# Patient Record
Sex: MALE | Race: ASIAN | Employment: STUDENT | URBAN - METROPOLITAN AREA
[De-identification: names, ages, dates, MRNs, and addresses within clinical notes are randomized per-mention and may not be internally consistent; named-entity substitution may affect disease eponyms.]

---

## 2019-11-12 ENCOUNTER — TRANSFERRED RECORDS (OUTPATIENT)
Dept: HEALTH INFORMATION MANAGEMENT | Facility: CLINIC | Age: 24
End: 2019-11-12

## 2020-01-13 ENCOUNTER — ANCILLARY PROCEDURE (OUTPATIENT)
Dept: ULTRASOUND IMAGING | Facility: CLINIC | Age: 25
End: 2020-01-13
Attending: FAMILY MEDICINE
Payer: COMMERCIAL

## 2020-01-13 DIAGNOSIS — N50.811 TESTICULAR PAIN, RIGHT: ICD-10-CM

## 2020-01-16 ENCOUNTER — MEDICAL CORRESPONDENCE (OUTPATIENT)
Dept: HEALTH INFORMATION MANAGEMENT | Facility: CLINIC | Age: 25
End: 2020-01-16

## 2020-03-24 ENCOUNTER — NURSE TRIAGE (OUTPATIENT)
Dept: NURSING | Facility: CLINIC | Age: 25
End: 2020-03-24

## 2020-03-24 ENCOUNTER — HOSPITAL ENCOUNTER (EMERGENCY)
Facility: CLINIC | Age: 25
Discharge: HOME | End: 2020-03-24
Attending: EMERGENCY MEDICINE | Admitting: EMERGENCY MEDICINE
Payer: COMMERCIAL

## 2020-03-24 VITALS
TEMPERATURE: 98 F | DIASTOLIC BLOOD PRESSURE: 52 MMHG | HEART RATE: 72 BPM | OXYGEN SATURATION: 100 % | WEIGHT: 160 LBS | SYSTOLIC BLOOD PRESSURE: 122 MMHG | HEIGHT: 73 IN | BODY MASS INDEX: 21.2 KG/M2 | RESPIRATION RATE: 16 BRPM

## 2020-03-24 DIAGNOSIS — R07.9 CHEST PAIN, UNSPECIFIED TYPE: ICD-10-CM

## 2020-03-24 DIAGNOSIS — R05.9 COUGH: ICD-10-CM

## 2020-03-24 DIAGNOSIS — J45.909 ASTHMA, UNSPECIFIED ASTHMA SEVERITY, UNSPECIFIED WHETHER COMPLICATED, UNSPECIFIED WHETHER PERSISTENT: ICD-10-CM

## 2020-03-24 LAB — INTERPRETATION ECG - MUSE: NORMAL

## 2020-03-24 PROCEDURE — 93010 ELECTROCARDIOGRAM REPORT: CPT | Mod: Z6 | Performed by: EMERGENCY MEDICINE

## 2020-03-24 PROCEDURE — 93005 ELECTROCARDIOGRAM TRACING: CPT | Performed by: EMERGENCY MEDICINE

## 2020-03-24 PROCEDURE — 99283 EMERGENCY DEPT VISIT LOW MDM: CPT | Mod: 25 | Performed by: EMERGENCY MEDICINE

## 2020-03-24 PROCEDURE — 99283 EMERGENCY DEPT VISIT LOW MDM: CPT | Performed by: EMERGENCY MEDICINE

## 2020-03-24 RX ORDER — ALBUTEROL SULFATE 90 UG/1
2 AEROSOL, METERED RESPIRATORY (INHALATION) ONCE
Status: DISCONTINUED | OUTPATIENT
Start: 2020-03-24 | End: 2020-03-24 | Stop reason: HOSPADM

## 2020-03-24 ASSESSMENT — ENCOUNTER SYMPTOMS
GASTROINTESTINAL NEGATIVE: 1
FEVER: 0
SHORTNESS OF BREATH: 0
COUGH: 1

## 2020-03-24 ASSESSMENT — MIFFLIN-ST. JEOR: SCORE: 1769.64

## 2020-03-24 NOTE — ED AVS SNAPSHOT
[facility], [place], Emergency Department  [address] [place] [id number]  Phone:  [phone]                                    Jacob Bolaños   MRN: [id number]    Department:  [facility], Emergency Department   Date of Visit:  3/24/2020           After Visit Summary Signature Page    I have received my discharge instructions, and my questions have been answered. I have discussed any challenges I see with this plan with the nurse or doctor.    ..........................................................................................................................................  Patient/Patient Representative Signature      ..........................................................................................................................................  Patient Representative Print Name and Relationship to Patient    ..................................................               ................................................  Date                                   Time    ..........................................................................................................................................  Reviewed by Signature/Title    ...................................................              ..............................................  Date                                               Time          22EPIC Rev 08/18

## 2020-03-25 NOTE — ED TRIAGE NOTES
Cold sx with runny nose and swollen lymph nodes last week got better and then appeared again.  Reports infrequent cough x 3 days and dyspnea since 3 PM today with pressure over his chest and states it feels like someone is sitting on his chest.  Reports hx of asthma but hasn't had an asthma attack in over a decade. Used his inhaler which he hasn't used in 5 years and states no change in his sx.

## 2020-03-25 NOTE — ED PROVIDER NOTES
ED Provider Note  [facility]      History     Chief Complaint   Patient presents with     Cough     Shortness of Breath     Chest Pain     HPI  Jacob Bolaños is a 24 year old male who is a [employer] student.  He has a history of well-controlled asthma.  He actually worked out at the gym today.  He had a little cough and a little bit of chest pain as well he called the nurse line and because of the asthma he was told to come to the emergency department.  Here he is afebrile, he is not tachycardic his lungs are clear there is no wheezing his oxygen saturations are 100%.  He has no infectious contacts that he is aware of and no recent travel.  He is not used his inhaler.    Past Medical History  Past Medical History:   Diagnosis Date     Uncomplicated asthma      Past Surgical History:   Procedure Laterality Date     APPENDECTOMY       EXTRACTION(S) DENTAL       HERNIA REPAIR       ALBUTEROL IN      Allergies   Allergen Reactions     Zithromax [Azithromycin] Other (See Comments)     Stomachache     Past medical history, past surgical history, medications, and allergies were reviewed with the patient. Additional pertinent items: None    Family History  History reviewed. No pertinent family history.  Family history was reviewed with the patient. Additional pertinent items: None    Social History  Social History     Tobacco Use     Smoking status: Never Smoker     Smokeless tobacco: Never Used   Substance Use Topics     Alcohol use: Yes     Comment: socially      Drug use: Not Currently      Social history was reviewed with the patient. Additional pertinent items: None    Review of Systems   Constitutional: Negative for fever.   HENT: Negative.    Respiratory: Positive for cough. Negative for shortness of breath.    Cardiovascular: Positive for chest pain.   Gastrointestinal: Negative.    All other systems reviewed and are negative.    A complete review of systems was performed with  "pertinent positives and negatives noted in the HPI, and all other systems negative.    Physical Exam   BP: 122/52  Pulse: 72  Temp: 98  F (36.7  C)  Resp: 18  Height: 185.4 cm (6' 1\")  Weight: 72.6 kg (160 lb)  SpO2: 100 %  Physical Exam  Vitals signs and nursing note reviewed.   Constitutional:       Appearance: He is well-developed.   HENT:      Mouth/Throat:      Mouth: Mucous membranes are moist.   Cardiovascular:      Rate and Rhythm: Normal rate and regular rhythm.   Pulmonary:      Effort: Pulmonary effort is normal.      Breath sounds: Normal breath sounds. No wheezing.   Neurological:      Mental Status: He is alert.   Psychiatric:         Mood and Affect: Mood normal.         Behavior: Behavior normal.         ED Course      Procedures             EKG Interpretation:      Interpreted by Bhupendra King MD  Time reviewed: 9:36 PM   Symptoms at time of EKG: CP   Rhythm: normal sinus   Rate: normal  Axis: normal  Ectopy: none  Conduction: normal  ST Segments/ T Waves: No ST-T wave changes  Q Waves: none  Comparison to prior: No old EKG available    Clinical Impression: normal EKG           No results found for any visits on 03/24/20.  Medications   albuterol (PROAIR HFA/PROVENTIL HFA/VENTOLIN HFA) 108 (90 Base) MCG/ACT inhaler 2 puff (has no administration in time range)        Assessments & Plan (with Medical Decision Making)   24-year-old well-appearing male with history of asthma presents with concern for Covid.  Here he has no fever his lungs are clear and his oxygen saturations are 100%.  He had a chest x-ray done that was normal.  He has absolutely no wheezing on his examination.  He does not qualify in my opinion for any additional work-up such as chest x-ray or labs or influenza testing.  His symptoms are minimal and very acute.  He has considerable worry.  I tried to reassure him.  His condition could change at which time he could be reevaluated in the emergency department but now he does " not meet criteria for testing.  He has an albuterol inhaler that he can use and he can check his temperature at home.    I have reviewed the nursing notes. I have reviewed the findings, diagnosis, plan and need for follow up with the patient.    New Prescriptions    No medications on file       Final diagnoses:   Cough       --  Bhupendra King MD   Emergency Medicine   [facility], [place], EMERGENCY DEPARTMENT  3/24/2020     Bhupendra King MD  03/24/20 0857

## 2020-03-25 NOTE — TELEPHONE ENCOUNTER
Jacob calls and says that he is SOB. SOB began this afternoon. Pt. Says that he has mild asthma, too. Pt. Says that he had cold symptoms for 1 week, with a runny nose and a slight cough. Pt. Denies any cough today. Afebrile. Pt. Says that the glands under his jaw are slightly swollen. Pt. Says that he also has pain in his sternum. Sternum pain began today around 3pm or 4pm. Pt. Is not going to call 911, but will have his friend drive him to the [facility] ER now. RN then called that ER and spoke to Roosevelt-nurse. RN told Roosevelt about pt's symptoms and about pt's impending arrival. Roosevelt voiced understanding. COVID 19 Nurse Triage Plan    Please be aware that novel coronavirus (COVID-19) may be circulating in the community. If you develop symptoms such as fever, cough, or SOB or if you have concerns about the presence of another infection including coronavirus (COVID-19), please contact your health care provider or visit [email or web address].     Patient HAS NO known exposure, fever, cough or SOB in addition to reason for call.    Additional General Information About COVID-19    COVID-19 - General Information:  Regardless of if you have been tested or not:  Patient who have symptoms (cough, fever, or shortness of breath), need to isolate for 7 days from when symptoms started AND 72 hours after fever resolves (without fever reducing medications) AND improvement of respiratory symptoms (whichever is longer).      Isolate yourself at home (in own room/own bathroom if possible)    Do Not allow any visitors    Do Not go to work or school    Do Not go to [employer],  centers, shopping, or other public places.    Do Not shake hands.    Avoid close and intimate contact with others (hugging, kissing).    Follow CDC recommendations for household cleaning of frequently touched services.     After the initial 7 days, continue to isolate yourself from household members as much as possible. To continue decrease  the risk of community spread and exposure, you and any members of your household should limit activities in public for 14 days after starting home isolation.     You can reference the following CDC link for helpful home isolation/care tips:  https://www.cdc.gov/coronavirus/2019-ncov/downloads/10Things.pdf    COVID-19 - Symptoms:     The COVID-19 can cause a respiratory illness, such as bronchitis or pneumonia.    The most common symptoms are: cough, fever, and shortness of breath.     Other symptoms are: body aches, chills, diarrhea, fatigue, headache, runny nose, and sore throat     COVID-19 - Exposure Risk Factors:    Exposure to a person who has been diagnosed with COVID-19 .    Travel from an area with recent local transmission of COVID-19 .    The CDC (www.cdc.gov) has the most up-to-date list of where the COVID-19 outbreak is occurring.    COVID-19 - Spreading:     The virus likely spreads through respiratory droplets produced when a person coughs or sneezes. These respiratory droplets can travel approximately 6 feet and can remain on surfaces.  Common disinfectants will kill the virus.    The CDC currently does not recommend healthy people wearing masks.    COVID-19 - Protect Yourself:     Avoid close contact with people known to have this new COVID-19 infection.    Wash hands often with soap and water or alcohol-based hand .    Avoid touching the eyes, nose or mouth.       Thank you for limiting contact with others, wearing a simple mask to cover your cough, practice good hand hygiene habits and accessing our virtual services where possible to limit the spread of this virus.    For more information about COVID19 and options for caring for yourself at home, please visit the CDC website at https://www.cdc.gov/coronavirus/2019-ncov/about/steps-when-sick.html  For more options for care at [facility], please visit our website at [email or web address]                   Reason for Disposition    [1] Chest pain lasts > 5 minutes AND [2] described as crushing, pressure-like, or heavy    Additional Information    Negative: [1] Breathing stopped AND [2] hasn't returned    Negative: Choking on something    Negative: Severe difficulty breathing (e.g., struggling for each breath, speaks in single words)    Negative: Bluish (or gray) lips or face now    Negative: Difficult to awaken or acting confused (e.g., disoriented, slurred speech)    Negative: Passed out (i.e., lost consciousness, collapsed and was not responding)    Negative: Wheezing started suddenly after medicine, an allergic food or bee sting    Negative: Stridor    Negative: Slow, shallow and weak breathing    Negative: Sounds like a life-threatening emergency to the triager    Chest pain    Negative: Severe difficulty breathing (e.g., struggling for each breath, speaks in single words)    Negative: Difficult to awaken or acting confused (e.g., disoriented, slurred speech)    Negative: Shock suspected (e.g., cold/pale/clammy skin, too weak to stand, low BP, rapid pulse)    Negative: [1] Chest pain lasts > 5 minutes AND [2] history of heart disease  (i.e., heart attack, bypass surgery, angina, angioplasty, CHF; not just a heart murmur)    Protocols used: CHEST PAIN-A-AH, BREATHING DIFFICULTY-A-AH

## 2020-03-25 NOTE — DISCHARGE INSTRUCTIONS
TODAY'S VISIT  YOU WERE EVALUATED TODAY AND IT HAS BEEN DECIDED YOU DID NOT NEED TO BE TESTED FOR COVID-19 (NOVEL CORONAVIRUS) AT THIS TIME.   -  The cause of your symptoms is not yet known,  and you did not meet criteria to be tested for COVID-19 at this time.   -  It has been determined that you do not medically need to be hospitalized at this time, and  you can be monitored with self-monitoring, and follow-up with your usual providers as needed (see information below).     SELF-MONITORING INSTRUCTIONS  STAY HOME. If you are feeling ill, we generally recommend people do not go to work, school, or public areas if you are able. Avoid using public transportation, ride-sharing (Uber/Lyft), or taxis. You should only leave your home if you require medical attention (See instructions below, please contact your provider first.)   DO NOT SHARE HOUSEHOLD ITEMS. Do not share dishes, drinking glasses, eating utensils, towels, bedding, etc., with others or pets in your home. These items should be washed with soap and water.   WASH YOUR HANDS OFTEN. Wash your hands with soap and water for at least 20 second, or use hand  regularly. Avoid touching your face with unwashed hands.   SEEK PROMPT MEDICAL ATTENTION IF YOUR ILLNESS IS WORSENING. Watch closely for new or worsening symptoms, such as fever, cough, shortness of breath or difficulty breathing.   PLEASE CONTACT YOUR HEALTHCARE PROVIDER FOR GUIDANCE, OR IF YOU HAVE REMAINING CONCERNS ABOUT THE POSSIBILITY OF COVID-19 INFECTION, TAKE THE FOLLOWING STEPS:  Go to [email or web address]. [facility] is our 24/7 online patient care portal. Once there, create an account and start your evaluation. Once you have submitted your information, you will receive care instructions relevant to your needs. If multiple people in your family need testing, each will have to start a separate case in [facility].  After reviewing the information you submitted about your symptoms and exposure, our care  providers may direct you to come to an [facility] clinic location for testing.  SIGN UP FOR [employer]. Sign up for the [facility] application, using the information at the end of this document. Your results and a message about those results can be sent through [employer]. If you do not have [employer], we will call you with your results, but it may take longer.  IF YOU NEED MEDICAL CARE OR HAVE A MEDICAL APPOINTMENT (and it is not an emergency):   -  CALL YOUR HEALTHCARE PROVIDER BEFORE GOING TO THE [facility]  -  TELL THEM THAT YOU ARE BEING TESTING FOR AND MAY HAVE COVID-19.  YOUR CLOSE CONTACTS SHOULD MONITOR THEIR HEALTH. If they develop symptoms, they should visit [email or web address] to determine if testing is needed, and where this is done.   If you have any questions, please contact your usual health care provider or the [place] [facility] ([facility]) at [phone]    EMERGENCY INSTRUCTIONS  IF YOU NEED EMERGENCY MEDICAL ATTENTION, CALL 911 AND LET THEM KNOW YOU MAY HAVE ARE BEING EVALUATED FOR COVID-19.    WHAT IS COVID-19 (CORONAVIRUS)  COVID-19 is a viral respiratory illness caused by a newly identified coronavirus that was discovered in late 2019 in [place]. Coronaviruses are a large family of viruses. Some coronaviruses cause illnesses in people and others only circulate among animals. Rarely, animal coronaviruses can evolve and infect people. The virus causing COVID-19 may have emerged from an animal source, and it is now able to spread from person to person.     How does it spread?   The virus is thought to spread between people who are in close contact (within about six feet) through respiratory droplets produced when an infected person coughs, sneezes, or talks. It may also spread when one person touches a surface or object that has the virus on it and then touches their mouth, nose, or eyes. However, this is not thought to be the main way the virus is transmitted.    SYMPTOMS  This coronavirus  causes mild to severe respiratory illness with often with fever, cough, and/or difficulty breathing. After exposure, symptoms typically present within 2 to 14 days.     TREATMENTS AND PREVENTION  Patients may also be asked to self-quarantine at home in order to prevent the spread of the coronavirus. There is no antiviral treatment recommended for COVID-19. People with COVID-19 may receive supportive care to help relieve symptoms.    Prevention  No vaccine is currently available for the coronavirus causing COVID-19. The best way to prevent spread of the illness is to avoid exposure through simple precautions. Prevention steps include:   Stay home if you're feeling sick.   Avoid close contact with others, and try to stay at least 6-feet away from others if you're ill.   Wash your hands often with soap and warm water for at least 20 seconds, especially after going to the bathroom; before eating; and after blowing your nose, coughing, or sneezing. Use an alcohol-based hand  with at least 60 percent alcohol if soap and water are not readily available.   Clean and disinfect frequently touched objects and surfaces using a regular household cleaning spray or wipe.     Thank you for your understanding and cooperation.

## 2020-05-23 ENCOUNTER — HOSPITAL ENCOUNTER (EMERGENCY)
Facility: CLINIC | Age: 25
Discharge: HOME | End: 2020-05-23
Attending: EMERGENCY MEDICINE | Admitting: EMERGENCY MEDICINE
Payer: COMMERCIAL

## 2020-05-23 VITALS
DIASTOLIC BLOOD PRESSURE: 62 MMHG | TEMPERATURE: 97.8 F | OXYGEN SATURATION: 99 % | RESPIRATION RATE: 18 BRPM | HEART RATE: 84 BPM | SYSTOLIC BLOOD PRESSURE: 97 MMHG

## 2020-05-23 DIAGNOSIS — F07.81 POST CONCUSSION SYNDROME: ICD-10-CM

## 2020-05-23 PROCEDURE — 99284 EMERGENCY DEPT VISIT MOD MDM: CPT | Mod: Z6 | Performed by: EMERGENCY MEDICINE

## 2020-05-23 PROCEDURE — 99283 EMERGENCY DEPT VISIT LOW MDM: CPT | Performed by: EMERGENCY MEDICINE

## 2020-05-23 RX ORDER — IBUPROFEN 800 MG/1
800 TABLET, FILM COATED ORAL EVERY 8 HOURS PRN
Qty: 60 TABLET | Refills: 0 | Status: SHIPPED | OUTPATIENT
Start: 2020-05-23 | End: 2020-05-31

## 2020-05-23 RX ORDER — ONDANSETRON 4 MG/1
4 TABLET, ORALLY DISINTEGRATING ORAL EVERY 8 HOURS PRN
Qty: 10 TABLET | Refills: 0 | Status: SHIPPED | OUTPATIENT
Start: 2020-05-23 | End: 2020-05-26

## 2020-05-23 RX ORDER — ACETAMINOPHEN 325 MG/1
325-650 TABLET ORAL EVERY 6 HOURS PRN
Qty: 30 TABLET | Refills: 0 | Status: SHIPPED | OUTPATIENT
Start: 2020-05-23 | End: 2020-08-25

## 2020-05-23 NOTE — ED AVS SNAPSHOT
[facility], [place], Emergency Department  [address]  [facility] [address]  Phone:  [phone]  Fax:  [phone]                                    Jacob Bolaños   MRN: [id number]    Department:  [facility], Emergency Department   Date of Visit:  5/23/2020           After Visit Summary Signature Page    I have received my discharge instructions, and my questions have been answered. I have discussed any challenges I see with this plan with the nurse or doctor.    ..........................................................................................................................................  Patient/Patient Representative Signature      ..........................................................................................................................................  Patient Representative Print Name and Relationship to Patient    ..................................................               ................................................  Date                                   Time    ..........................................................................................................................................  Reviewed by Signature/Title    ...................................................              ..............................................  Date                                               Time          22EPIC Rev 08/18

## 2020-05-23 NOTE — ED NOTES
Discharge instructions reviewed with patient, patient verbalized understanding.  Patient discharged with prescriptions in stable condition.

## 2020-05-23 NOTE — ED PROVIDER NOTES
ED Provider Note  [facility]      History     Chief Complaint   Patient presents with     Head Injury     Cough     Generalized Body Aches     HPI  Jacob Bolaños is a 25 year old male with a medical history significant for asthma who presents to the Emergency Department for evaluation after a head injury while playing volleyball 3 days ago and for evaluation of an intermittent cough for the last several weeks.  Patient states is no longer having cough and having no issues with difficulty breathing or fevers.  As the head injury goes, patient states he was hit on the left side of his head with a friend's head while playing volleyball.  He denies any loss of consciousness but states he has had intermittent headaches for the last 3 days.  He did have some nausea initially when he hit his head but denies any ongoing nausea.  Patient has not taken any medications for his headache.    Past Medical History  Past Medical History:   Diagnosis Date     Uncomplicated asthma      Past Surgical History:   Procedure Laterality Date     APPENDECTOMY       EXTRACTION(S) DENTAL       HERNIA REPAIR       ALBUTEROL IN      Allergies   Allergen Reactions     Zithromax [Azithromycin] Other (See Comments)     Stomachache     Past medical history, past surgical history, medications, and allergies were reviewed with the patient. Additional pertinent items: None    Family History  No family history on file.  Family history was reviewed with the patient. Additional pertinent items: None    Social History  Social History     Tobacco Use     Smoking status: Never Smoker     Smokeless tobacco: Never Used   Substance Use Topics     Alcohol use: Yes     Comment: socially      Drug use: Not Currently      Social history was reviewed with the patient. Additional pertinent items: None    Review of Systems  ROS: 14 point ROS neg other than the symptoms noted above in the HPI.      Physical Exam     ED Triage Vitals  [05/23/20 1007]   Enc Vitals Group      BP 97/62      Pulse 84      Resp 18      Temp 97.8  F (36.6  C)      Temp src Oral      SpO2 99 %     Physical Exam   GEN: Well appearing, non toxic, cooperative.   HEENT: The head is normocephalic and atraumatic. Pupils are equal round and reactive to light. Extraocular motions are intact. There is no facial swelling. The neck is nontender and supple.   CV: Regular rate and rhythm without murmurs rubs or gallops. 2+ radial pulses bilaterally.  PULM: Clear to auscultation bilaterally.  ABD: Soft, nontender, nondistended.   EXT: Full range of motion.  No edema.  NEURO: Cranial nerves II through XII are intact and symmetric. Bilateral upper and lower extremities grossly show full range of motion without any focal deficits.   SKIN: No rashes, ecchymosis, or lacerations  PSYCH: Calm and cooperative, interactive.       ED Course      Procedures                         No results found for any visits on 05/23/20.  Medications - No data to display     Assessments & Plan (with Medical Decision Making)   Patient is a healthy 25-year-old male who presents with 3 days of intermittent headaches after mild head injury while playing volleyball.  Initial vitals were within normal limits.  Exam as above and most notable for normal neurologic exam.  I suspect the patient had a concussion 3 days ago when he hit his head.  Based on the length of time since the injury, no head imaging is needed at this time.  I will plan on treating patient has postconcussive syndrome and a referral was placed to the TBI clinic.  Patient was discharged home with symptomatic care.  He was instructed on postconcussion care and to limit further head injury as well as limiting screen time as if able.  Patient was discharged home in stable condition but also given strict return precautions.    I have reviewed the nursing notes. I have reviewed the findings, diagnosis, plan and need for follow up with the  patient.    Discharge Medication List as of 5/23/2020 10:17 AM      START taking these medications    Details   acetaminophen (TYLENOL) 325 MG tablet Take 1-2 tablets (325-650 mg) by mouth every 6 hours as needed for mild pain, Disp-30 tablet,R-0, Local Print      ibuprofen (ADVIL/MOTRIN) 800 MG tablet Take 1 tablet (800 mg) by mouth every 8 hours as needed for moderate pain, Disp-60 tablet,R-0, Local Print      ondansetron (ZOFRAN ODT) 4 MG ODT tab Take 1 tablet (4 mg) by mouth every 8 hours as needed for nausea, Disp-10 tablet,R-0, Local Print             Final diagnoses:   Post concussion syndrome       --    [facility], [place], EMERGENCY DEPARTMENT  5/23/2020     Ezekiel nAand MD  05/23/20 6953

## 2020-05-23 NOTE — ED TRIAGE NOTES
Patient states that he ran into another person while playing volleyball 3 days ago.  He initially had ringing in the ears and now complains of headache, and light and sound sensitivity. Had an infrequent cough the past ser audrey weeks and some chest tightness. No fever, nausea, vomiting, or diarrhea.

## 2020-08-03 NOTE — TELEPHONE ENCOUNTER
MEDICAL RECORDS REQUEST   [facility]  Urology Clinic  [address] [place] [address]  PHONE: [phone]  Fax: [phone]        FUTURE VISIT INFORMATION                                                   Jacob Bolaños : 1995 scheduled for future visit at [facility]    APPOINTMENT INFORMATION:    Date: 20 8:30AM    Provider:  Say Vieira MD    Reason for Visit/Diagnosis: Testicular pain     REFERRAL INFORMATION:    Referring provider:  N/A    Specialty: N/A    Referring providers clinic:  [facility] contact number:  N/A    RECORDS REQUESTED FOR VISIT                                                     NOTES  STATUS/DETAILS   OFFICE NOTE from referring provider  in process   OFFICE NOTE from other specialist  in process   DISCHARGE SUMMARY from hospital  no   DISCHARGE REPORT from the ER  no   OPERATIVE REPORT  no   MEDICATION LIST  no     PRE-VISIT CHECKLIST      Record collection complete YES- Recs received sent HIM Urgent message to scan and upload-  9:14AM    If no, please explain: CSS faxed to [facility] - 8/3    20 - IN [facility] PACS    Appointment appropriately scheduled           (right time/right provider) Yes   MyChart activation Yes   Questionnaire complete If no, please explain: In process      Action Madelaine 20 8:30MA   Action Taken CSS called [facility] - spoke with Clive who will look into request and call me back or fax recs.      Completed by: Madelaine Amato

## 2020-08-10 ENCOUNTER — PRE VISIT (OUTPATIENT)
Dept: UROLOGY | Facility: CLINIC | Age: 25
End: 2020-08-10

## 2020-08-10 NOTE — TELEPHONE ENCOUNTER
Reason for Visit: Consult    Diagnosis: Testicular Pain    Orders/Procedures/Records: in system    Contact Patient: n/a    Rooming Requirements: normal      Ashley Bullock LPN  08/10/20  2:17 PM

## 2020-08-18 ENCOUNTER — VIRTUAL VISIT (OUTPATIENT)
Dept: UROLOGY | Facility: CLINIC | Age: 25
End: 2020-08-18
Payer: COMMERCIAL

## 2020-08-18 DIAGNOSIS — R10.33 UMBILICAL PAIN: ICD-10-CM

## 2020-08-18 DIAGNOSIS — N45.1 EPIDIDYMITIS, RIGHT: ICD-10-CM

## 2020-08-18 DIAGNOSIS — N50.82 SCROTAL PAIN: Primary | ICD-10-CM

## 2020-08-18 DIAGNOSIS — Z87.19 HISTORY OF RIGHT INGUINAL HERNIA: ICD-10-CM

## 2020-08-18 RX ORDER — SULFAMETHOXAZOLE/TRIMETHOPRIM 800-160 MG
1 TABLET ORAL 2 TIMES DAILY
Qty: 20 TABLET | Refills: 0 | Status: SHIPPED | OUTPATIENT
Start: 2020-08-18 | End: 2020-08-28

## 2020-08-18 ASSESSMENT — PAIN SCALES - GENERAL: PAINLEVEL: NO PAIN (0)

## 2020-08-18 NOTE — PATIENT INSTRUCTIONS
UROLOGY CLINIC VISIT PATIENT INSTRUCTIONS    Start taking the Bactrim antibiotic twice a day for 10 days to treat possible bacteria epididymitis or orchitis.     Follow up for a CT scan of the abdomen and pelvis to check for hernias.    If you have any issues, questions or concerns in the meantime, do not hesitate to contact us at [phone] or via [employer].     It was a pleasure meeting with you today.  Thank you for allowing me and my team the privilege of caring for you today.  YOU are the reason we are here, and I truly hope we provided you with the excellent service you deserve.  Please let us know if there is anything else we can do for you so that we can be sure you are leaving completely satisfied with your care experience.

## 2020-08-18 NOTE — PROGRESS NOTES
"Jacob Bolaños is a 25 year old male who is being evaluated via a billable video visit.      The patient has been notified of following:     \"This video visit will be conducted via a call between you and your physician/provider. We have found that certain health care needs can be provided without the need for an in-person physical exam.  This service lets us provide the care you need with a video conversation.  If a prescription is necessary we can send it directly to your pharmacy.  If lab work is needed we can place an order for that and you can then stop by our lab to have the test done at a later time.    Video visits are billed at different rates depending on your insurance coverage.  Please reach out to your insurance provider with any questions.    If during the course of the call the physician/provider feels a video visit is not appropriate, you will not be charged for this service.\"    Patient has given verbal consent for Video visit? Yes  How would you like to obtain your AVS? MyChart  If you are dropped from the video visit, the video invite should be resent to:   Will anyone else be joining your video visit? No        Urology Virtual Visit - Consultation    CC: testicular pain    HPI: Mr. Jacob Bolaños is a very pleasant 25 year old male who is being evaluated via billable video visit in consultation from Dr. East for testicular pain. According to the patient, his symptoms first started in October/November 2019 with acute right-sided testicular pain. He was diagnosed with orchitis through [facility] where UA and STI testing were negative. No specific treatment recommended other than supportive measures (was told it was likely a viral illness). He notes that symptoms improved within 2-3 weeks, though he has had several flare ups of similar pain since then. A scrotal ultrasound during the December/January flare up was normal (see below). He had another flare in May which lasted for 2-3 " weeks. His most recent flare started 2-3 weeks ago and has been persistent. He notes that these flare ups of right-sided testicular pain are unprovoked without obvious inciting injury or trauma. He does have a history of a right-sided testicle injury leading to right inguinal hernia s/p repair in 2006.     Current symptoms are characterized by right-sided testicular pain, intermittent left-sided testicular pain with this most recent flare, intermittent dysuria and pain after ejaculation, as well as some urinary frequency and urgency. He also notes fecal urgency over the past several months. The pain occasionally radiates into his abdomen as well as down his right leg into his big toe. He denies history of low back issues or spinal pathology. Regarding the abdominal pain, he describes it as a burning pain localized to just above and to the left of the belly button. He also feels a possible lump in this area and it is painful when he urinates. He also believes that he tore a muscle in his abdomen while moving a couch recently (felt a pop), but this is located just above and to the right of his belly button. He believes the area of the possible muscle tear on the right feels different than the possible lump on the left. He is concerned about the possibility of another hernia.       Past Medical History:   Diagnosis Date     Uncomplicated asthma      Past Surgical History:   Procedure Laterality Date     APPENDECTOMY       EXTRACTION(S) DENTAL       HERNIA REPAIR       Zithromax [azithromycin]  Current Outpatient Medications   Medication     acetaminophen (TYLENOL) 325 MG tablet     ALBUTEROL IN     sulfamethoxazole-trimethoprim (BACTRIM DS) 800-160 MG tablet     No current facility-administered medications for this visit.      Family History:   No family history on file.    Social History:   Social History     Tobacco Use     Smoking status: Never Smoker     Smokeless tobacco: Never Used   Substance Use Topics      Alcohol use: Yes     Comment: socially      Drug use: Not Currently       ROS:  A comprehensive 10 point review of systems was obtained and was negative except for that outlined above in the HPI.    PHYSICAL EXAM:  GENERAL: Healthy, alert and no distress  EYES: Eyes grossly normal to inspection.  No discharge or erythema, or obvious scleral/conjunctival abnormalities.  RESP: No audible wheeze, cough, or visible cyanosis.  No visible retractions or increased work of breathing.    SKIN: Visible skin clear. No significant rash, abnormal pigmentation or lesions.  NEURO: Cranial nerves grossly intact.  Mentation and speech appropriate for age.  PSYCH: Mentation appears normal, affect normal/bright, judgement and insight intact, normal speech and appearance well-groomed.    Labs:   Recent UA and STI testing through [facility] negative    Imaging:   US TESTICULAR AND SCROTAL, 1/13/2020   Findings:  The testes demonstrate normal and symmetric echotexture and  vascularity. No evidence of a focal lesion.  The right testicle  measures 2.8 x 1.9 x 3.8 cm and the left measures 2.4 x 2.4 x 3.9 cm.  There is no evidence of torsion.     There is no evidence of a significant hydrocele or varicocele.     Both the right and left epididymis are within normal limits.    Impression: Normal testicular ultrasound.      ASSESSMENT/PLAN:  Mr. Jacob Bolaños is a 25 year old male with right-sided testicular pain, dysuria, frequency, urgency, as well as a painful umbilical mass.     For the scrotal pain and urinary symptoms, discussed possible differentials including infection, inflammation, musculoskeletal, nerve pain, vs other. He has had numerous flare ups of these symptoms since initial presentation in Oct/Nov 2019. No antibiotics trialed thus far. We discussed empiric antibiotics to see if symptoms respond. He is amenable and would like to proceed.  -Bactrim DS BID x 10 days. Side effects discussed.   -If no improvement,  consider referral to pelvic floor physical therapy.    For the umbilical lump/pain, discussed that it is unclear whether this is related to his urinary/scrotal symptoms or a separate issue. Possible differentials may include umbilical hernia, muscle strain, urachal cyst/remnant, vs other. Discussed possible observation to see if symptoms respond to antibiotics vs. further evaluation. He states that it has been very bothersome and is disrupting his activities of daily living and he would like to pursue additional diagnostics.  -Will schedule CT abdomen/pelvis w/contrast to further evaluate for possible hernia, vs urachal cyst/remnant, vs other.     Thank you for the opportunity to participate in the care of this very pleasant patient. I will keep you updated on his progress.      Video-Visit Details    Type of service:  Video Visit    Video Start Time: 12:33 PM  Video End Time: 1:02 PM    Originating Location (pt. Location): Home    Distant Location (provider location):  [facility] UROLOGY AND [facility] FOR PROSTATE AND UROLOGIC CANCERS     Platform used for Video Visit: [employer]    China Delgado PA-C

## 2020-08-18 NOTE — LETTER
"8/18/2020       RE: Jacob Bolaños  [address]  [place] [address]     Dear Colleague,    Thank you for referring your patient, Jacob Bolaños, to the [facility] UROLOGY AND INST FOR PROSTATE AND UROLOGIC CANCERS at [facility]. Please see a copy of my visit note below.    Jacob Bolaños is a 25 year old male who is being evaluated via a billable video visit.      The patient has been notified of following:     \"This video visit will be conducted via a call between you and your physician/provider. We have found that certain health care needs can be provided without the need for an in-person physical exam.  This service lets us provide the care you need with a video conversation.  If a prescription is necessary we can send it directly to your pharmacy.  If lab work is needed we can place an order for that and you can then stop by our lab to have the test done at a later time.    Video visits are billed at different rates depending on your insurance coverage.  Please reach out to your insurance provider with any questions.    If during the course of the call the physician/provider feels a video visit is not appropriate, you will not be charged for this service.\"    Patient has given verbal consent for Video visit? Yes  How would you like to obtain your AVS? MyChart  If you are dropped from the video visit, the video invite should be resent to:   Will anyone else be joining your video visit? No        Urology Virtual Visit - Consultation    CC: testicular pain    HPI: Mr. Jacob Bolaños is a very pleasant 25 year old male who is being evaluated via billable video visit in consultation from Dr. East for testicular pain. According to the patient, his symptoms first started in October/November 2019 with acute right-sided testicular pain. He was diagnosed with orchitis through [facility] where UA and STI testing were negative. No specific " treatment recommended other than supportive measures (was told it was likely a viral illness). He notes that symptoms improved within 2-3 weeks, though he has had several flare ups of similar pain since then. A scrotal ultrasound during the December/January flare up was normal (see below). He had another flare in May which lasted for 2-3 weeks. His most recent flare started 2-3 weeks ago and has been persistent. He notes that these flare ups of right-sided testicular pain are unprovoked without obvious inciting injury or trauma. He does have a history of a right-sided testicle injury leading to right inguinal hernia s/p repair in 2006.     Current symptoms are characterized by right-sided testicular pain, intermittent left-sided testicular pain with this most recent flare, intermittent dysuria and pain after ejaculation, as well as some urinary frequency and urgency. He also notes fecal urgency over the past several months. The pain occasionally radiates into his abdomen as well as down his right leg into his big toe. He denies history of low back issues or spinal pathology. Regarding the abdominal pain, he describes it as a burning pain localized to just above and to the left of the belly button. He also feels a possible lump in this area and it is painful when he urinates. He also believes that he tore a muscle in his abdomen while moving a couch recently (felt a pop), but this is located just above and to the right of his belly button. He believes the area of the possible muscle tear on the right feels different than the possible lump on the left. He is concerned about the possibility of another hernia.       Past Medical History:   Diagnosis Date     Uncomplicated asthma      Past Surgical History:   Procedure Laterality Date     APPENDECTOMY       EXTRACTION(S) DENTAL       HERNIA REPAIR       Zithromax [azithromycin]  Current Outpatient Medications   Medication     acetaminophen (TYLENOL) 325 MG tablet      ALBUTEROL IN     sulfamethoxazole-trimethoprim (BACTRIM DS) 800-160 MG tablet     No current facility-administered medications for this visit.      Family History:   No family history on file.    Social History:   Social History     Tobacco Use     Smoking status: Never Smoker     Smokeless tobacco: Never Used   Substance Use Topics     Alcohol use: Yes     Comment: socially      Drug use: Not Currently       ROS:  A comprehensive 10 point review of systems was obtained and was negative except for that outlined above in the HPI.    PHYSICAL EXAM:  GENERAL: Healthy, alert and no distress  EYES: Eyes grossly normal to inspection.  No discharge or erythema, or obvious scleral/conjunctival abnormalities.  RESP: No audible wheeze, cough, or visible cyanosis.  No visible retractions or increased work of breathing.    SKIN: Visible skin clear. No significant rash, abnormal pigmentation or lesions.  NEURO: Cranial nerves grossly intact.  Mentation and speech appropriate for age.  PSYCH: Mentation appears normal, affect normal/bright, judgement and insight intact, normal speech and appearance well-groomed.    Labs:   Recent UA and STI testing through [facility] negative    Imaging:   US TESTICULAR AND SCROTAL, 1/13/2020   Findings:  The testes demonstrate normal and symmetric echotexture and  vascularity. No evidence of a focal lesion.  The right testicle  measures 2.8 x 1.9 x 3.8 cm and the left measures 2.4 x 2.4 x 3.9 cm.  There is no evidence of torsion.     There is no evidence of a significant hydrocele or varicocele.     Both the right and left epididymis are within normal limits.    Impression: Normal testicular ultrasound.      ASSESSMENT/PLAN:  Mr. Jacob Bolaños is a 25 year old male with right-sided testicular pain, dysuria, frequency, urgency, as well as a painful umbilical mass.     For the scrotal pain and urinary symptoms, discussed possible differentials including infection, inflammation,  musculoskeletal, nerve pain, vs other. He has had numerous flare ups of these symptoms since initial presentation in Oct/Nov 2019. No antibiotics trialed thus far. We discussed empiric antibiotics to see if symptoms respond. He is amenable and would like to proceed.  -Bactrim DS BID x 10 days. Side effects discussed.   -If no improvement, consider referral to pelvic floor physical therapy.    For the umbilical lump/pain, discussed that it is unclear whether this is related to his urinary/scrotal symptoms or a separate issue. Possible differentials may include umbilical hernia, muscle strain, urachal cyst/remnant, vs other. Discussed possible observation to see if symptoms respond to antibiotics vs. further evaluation. He states that it has been very bothersome and is disrupting his activities of daily living and he would like to pursue additional diagnostics.  -Will schedule CT abdomen/pelvis w/contrast to further evaluate for possible hernia, vs urachal cyst/remnant, vs other.     Thank you for the opportunity to participate in the care of this very pleasant patient. I will keep you updated on his progress.      Video-Visit Details    Type of service:  Video Visit    Video Start Time: 12:33 PM  Video End Time: 1:02 PM    Originating Location (pt. Location): Home    Distant Location (provider location):  [facility] UROLOGY AND [facility] FOR PROSTATE AND UROLOGIC CANCERS     Platform used for Video Visit: [employer]    China Delgado PA-C

## 2020-08-19 ENCOUNTER — ANCILLARY PROCEDURE (OUTPATIENT)
Dept: CT IMAGING | Facility: CLINIC | Age: 25
End: 2020-08-19
Attending: PHYSICIAN ASSISTANT
Payer: COMMERCIAL

## 2020-08-19 DIAGNOSIS — N45.1 EPIDIDYMITIS, RIGHT: ICD-10-CM

## 2020-08-19 RX ORDER — IOPAMIDOL 755 MG/ML
99 INJECTION, SOLUTION INTRAVASCULAR ONCE
Status: COMPLETED | OUTPATIENT
Start: 2020-08-19 | End: 2020-08-19

## 2020-08-19 RX ADMIN — IOPAMIDOL 99 ML: 755 INJECTION, SOLUTION INTRAVASCULAR at 17:55

## 2020-08-20 ENCOUNTER — PRE VISIT (OUTPATIENT)
Dept: UROLOGY | Facility: CLINIC | Age: 25
End: 2020-08-20

## 2020-08-20 ENCOUNTER — PATIENT OUTREACH (OUTPATIENT)
Dept: SURGERY | Facility: CLINIC | Age: 25
End: 2020-08-20

## 2020-08-20 ENCOUNTER — TELEPHONE (OUTPATIENT)
Dept: UROLOGY | Facility: CLINIC | Age: 25
End: 2020-08-20

## 2020-08-20 DIAGNOSIS — K42.9 UMBILICAL HERNIA WITHOUT OBSTRUCTION AND WITHOUT GANGRENE: Primary | ICD-10-CM

## 2020-08-20 NOTE — PROGRESS NOTES
Patient had questions about his insurance and his upcoming appointment with Dr. Dent. He states [place] visits have been covered in the past. Discussed that if he can be seen by Dr. Vieira, that Dr. Dent should be in network also. Advised patient to contact his insurance to discuss.

## 2020-08-20 NOTE — PROGRESS NOTES
A referral was placed for this patient to consult with General Surgery regarding an umbilical hernia. Consultation arranged with Dr. Dent 8/21 at 1230.

## 2020-08-20 NOTE — TELEPHONE ENCOUNTER
Contacted patient and advised him that referral was placed by China Delgado PA-C.  Ashley Bullock LPN

## 2020-08-20 NOTE — TELEPHONE ENCOUNTER
[facility] Call Center    Phone Message    May a detailed message be left on voicemail: yes     Reason for Call: Other: Pt called and would like for China to send a referral to General Surgery for his Hernia. Pt request a call back once that referral has been put in. Thanks     Action Taken: Message routed to:  [facility]    Travel Screening: Not Applicable

## 2020-08-21 ENCOUNTER — OFFICE VISIT (OUTPATIENT)
Dept: SURGERY | Facility: CLINIC | Age: 25
End: 2020-08-21
Payer: COMMERCIAL

## 2020-08-21 ENCOUNTER — TELEPHONE (OUTPATIENT)
Dept: SURGERY | Facility: CLINIC | Age: 25
End: 2020-08-21

## 2020-08-21 VITALS
OXYGEN SATURATION: 98 % | BODY MASS INDEX: 21.48 KG/M2 | HEIGHT: 73 IN | HEART RATE: 67 BPM | WEIGHT: 162.1 LBS | SYSTOLIC BLOOD PRESSURE: 110 MMHG | DIASTOLIC BLOOD PRESSURE: 68 MMHG

## 2020-08-21 DIAGNOSIS — K42.9 UMBILICAL HERNIA WITHOUT OBSTRUCTION AND WITHOUT GANGRENE: ICD-10-CM

## 2020-08-21 DIAGNOSIS — Z11.59 ENCOUNTER FOR SCREENING FOR OTHER VIRAL DISEASES: Primary | ICD-10-CM

## 2020-08-21 ASSESSMENT — MIFFLIN-ST. JEOR: SCORE: 1774.16

## 2020-08-21 ASSESSMENT — PAIN SCALES - GENERAL: PAINLEVEL: NO PAIN (0)

## 2020-08-21 NOTE — LETTER
8/21/2020       RE: Jacob Bolaños  [address]  [place] [address]     Dear Colleague,    Thank you for referring your patient, Jacob Bolaños, to the [facility] GENERAL SURGERY at [facility]. Please see a copy of my visit note below.  New Hernia Consultation Note      Jacob Bolaños  [id number]  1995    Requesting Provider: China Delgado    Dear System, Provider Not In,    I was asked by China Delgdao to see this patient for the following problem:    CHIEF COMPLAINT:  Upper abdominal discomfort     HISTORY OF PRESENT ILLNESS:  Location: umbilical  Severity: Mild    Healthy 25M who presents with small umbilical hernia. He has several aches, including testicular pain from orchitis (on ABX), and pain in his upper abdominal wall. The only anatomic abnormality is umbilical hernia seen on CT, below the site of the pain. The patient does lift weights and is quite active.     NUTRITIONAL STATUS:    Body mass index is 21.39 kg/m .    Patient is not immunosuppressed.    Patient is not a current smoker.    Past Medical History:   Diagnosis Date     Uncomplicated asthma        There is no problem list on file for this patient.      Past Surgical History:   Procedure Laterality Date     APPENDECTOMY       EXTRACTION(S) DENTAL       HERNIA REPAIR         MEDICATIONS:  Current Outpatient Medications   Medication     acetaminophen (TYLENOL) 325 MG tablet     ALBUTEROL IN     sulfamethoxazole-trimethoprim (BACTRIM DS) 800-160 MG tablet     No current facility-administered medications for this visit.        ALLERGIES:  Allergies   Allergen Reactions     Zithromax [Azithromycin] Other (See Comments)     Stomachache       Social History     Socioeconomic History     Marital status: Single     Spouse name: None     Number of children: None     Years of education: None     Highest education level: None   Occupational History     None   Social Needs      "Financial resource strain: None     Food insecurity     Worry: None     Inability: None     Transportation needs     Medical: None     Non-medical: None   Tobacco Use     Smoking status: Never Smoker     Smokeless tobacco: Never Used   Substance and Sexual Activity     Alcohol use: Yes     Comment: socially      Drug use: Not Currently     Sexual activity: Yes     Partners: Female   Lifestyle     Physical activity     Days per week: None     Minutes per session: None     Stress: None   Relationships     Social connections     Talks on phone: None     Gets together: None     Attends Taoism service: None     Active member of club or organization: None     Attends meetings of clubs or organizations: None     Relationship status: None     Intimate partner violence     Fear of current or ex partner: None     Emotionally abused: None     Physically abused: None     Forced sexual activity: None   Other Topics Concern     None   Social History Narrative     None       No family history on file.    ROS    Orders Placed This Encounter   Procedures     Asymptomatic COVID-19 Virus (Coronavirus) by PCR     PAC Visit Referral (For [facility] Only)       PHYSICAL EXAMINATION:  Vital Signs: /68 (BP Location: Left arm, Patient Position: Sitting, Cuff Size: Adult Regular)   Pulse 67   Ht 1.854 m (6' 1\")   Wt 73.5 kg (162 lb 1.6 oz)   SpO2 98%   BMI 21.39 kg/m    HEENT: NCAT; MMM;   Lungs: Breathing unlabored  Abdomen: s/nt/nd, small reducible umbilical hernia,  RLQ incision well healed    PHYSICAL EXAM AREA OF INTEREST:  easily reducible.    IMAGING:  CT scan reviewed: yes    ASSESSMENT:  umbilical  Hernia size is less than 5cm in size.    DISCUSSION OF RISKS:  I discussed the alternatives, benefits, risks and possible complications of hernia repair with the patient. The risks of hernia surgery with and without mesh are described below.    Based on FDA s analysis of medical device adverse event reports and of peer-reviewed, " scientific literature, the most common adverse events for all surgical repair of hernias--with or without mesh--are pain, infection, hernia recurrence, scar-like tissue that sticks tissues together (adhesion), blockage of the large or small intestine (obstruction), bleeding, abnormal connection between organs, vessels, or intestines (fistula), fluid build-up at the surgical site (seroma), and a hole in neighboring tissues or organs (perforation).  Some other potential adverse events that can occur following hernia repair with mesh are mesh migration and mesh shrinkage (contraction).    Http://www.fda.gov/MedicalDevices/ProductsandMedicalProcedures/ImplantsandProsthetics/HerniaSurgicalMesh/default.htm  \  Risks explained: bleed, infection, recurrence  Plan for UHR to eliminate UH as a source; pt understands this may not relieve his discomfort.    PLAN:  Hernia surgery is indicated  Tentative plan ASC 8/31    COVID/PAC    Pt will call [facility] to schedule.    Again, thank you for allowing me to participate in the care of your patient.  Sincerely,    Dejan Dent MD

## 2020-08-21 NOTE — NURSING NOTE
"Chief Complaint   Patient presents with     Consult     New hernia surgery appt.       Vitals:    08/21/20 1209   BP: 110/68   BP Location: Left arm   Patient Position: Sitting   Cuff Size: Adult Regular   Pulse: 67   SpO2: 98%   Weight: 73.5 kg (162 lb 1.6 oz)   Height: 1.854 m (6' 1\")       Body mass index is 21.39 kg/m .                            BYRON GAINES EMT    "

## 2020-08-21 NOTE — LETTER
8/21/2020       RE: Jacob Bolaños  [address]  [facility] [address]     Dear Colleague,    Thank you for referring your patient, Jacob Bolaños, to the [facility] GENERAL SURGERY at [facility]. Please see a copy of my visit note below.        New Hernia Consultation Note      Jacob Bolaños  [id number]  1995    Requesting Provider: China Delgado    Dear System, Provider Not In,    I was asked by China Delgado to see this patient for the following problem:    CHIEF COMPLAINT:  Upper abdominal discomfort     HISTORY OF PRESENT ILLNESS:  Location: umbilical  Severity: Mild    Healthy 25M who presents with small umbilical hernia. He has several aches, including testicular pain from orchitis (on ABX), and pain in his upper abdominal wall. The only anatomic abnormality is umbilical hernia seen on CT, below the site of the pain. The patient does lift weights and is quite active.     NUTRITIONAL STATUS:    Body mass index is 21.39 kg/m .    Patient is not immunosuppressed.    Patient is not a current smoker.    Past Medical History:   Diagnosis Date     Uncomplicated asthma        There is no problem list on file for this patient.      Past Surgical History:   Procedure Laterality Date     APPENDECTOMY       EXTRACTION(S) DENTAL       HERNIA REPAIR         MEDICATIONS:  Current Outpatient Medications   Medication     acetaminophen (TYLENOL) 325 MG tablet     ALBUTEROL IN     sulfamethoxazole-trimethoprim (BACTRIM DS) 800-160 MG tablet     No current facility-administered medications for this visit.        ALLERGIES:  Allergies   Allergen Reactions     Zithromax [Azithromycin] Other (See Comments)     Stomachache       Social History     Socioeconomic History     Marital status: Single     Spouse name: None     Number of children: None     Years of education: None     Highest education level: None   Occupational History     None   Social  "Needs     Financial resource strain: None     Food insecurity     Worry: None     Inability: None     Transportation needs     Medical: None     Non-medical: None   Tobacco Use     Smoking status: Never Smoker     Smokeless tobacco: Never Used   Substance and Sexual Activity     Alcohol use: Yes     Comment: socially      Drug use: Not Currently     Sexual activity: Yes     Partners: Female   Lifestyle     Physical activity     Days per week: None     Minutes per session: None     Stress: None   Relationships     Social connections     Talks on phone: None     Gets together: None     Attends Tenriism service: None     Active member of club or organization: None     Attends meetings of clubs or organizations: None     Relationship status: None     Intimate partner violence     Fear of current or ex partner: None     Emotionally abused: None     Physically abused: None     Forced sexual activity: None   Other Topics Concern     None   Social History Narrative     None       No family history on file.    ROS    Orders Placed This Encounter   Procedures     Asymptomatic COVID-19 Virus (Coronavirus) by PCR     PAC Visit Referral (For [facility] Only)       PHYSICAL EXAMINATION:  Vital Signs: /68 (BP Location: Left arm, Patient Position: Sitting, Cuff Size: Adult Regular)   Pulse 67   Ht 1.854 m (6' 1\")   Wt 73.5 kg (162 lb 1.6 oz)   SpO2 98%   BMI 21.39 kg/m    HEENT: NCAT; MMM;   Lungs: Breathing unlabored  Abdomen: s/nt/nd, small reducible umbilical hernia,  RLQ incision well healed    PHYSICAL EXAM AREA OF INTEREST:  easily reducible.    IMAGING:  CT scan reviewed: yes    ASSESSMENT:  umbilical  Hernia size is less than 5cm in size.    DISCUSSION OF RISKS:  I discussed the alternatives, benefits, risks and possible complications of hernia repair with the patient. The risks of hernia surgery with and without mesh are described below.    Based on FDA s analysis of medical device adverse event reports and of " peer-reviewed, scientific literature, the most common adverse events for all surgical repair of hernias--with or without mesh--are pain, infection, hernia recurrence, scar-like tissue that sticks tissues together (adhesion), blockage of the large or small intestine (obstruction), bleeding, abnormal connection between organs, vessels, or intestines (fistula), fluid build-up at the surgical site (seroma), and a hole in neighboring tissues or organs (perforation).  Some other potential adverse events that can occur following hernia repair with mesh are mesh migration and mesh shrinkage (contraction).    Http://www.fda.gov/MedicalDevices/ProductsandMedicalProcedures/ImplantsandProsthetics/HerniaSurgicalMesh/default.htm  \  Risks explained: bleed, infection, recurrence  Plan for UHR to eliminate UH as a source; pt understands this may not relieve his discomfort.    PLAN:  Hernia surgery is indicated  Tentative plan ASC 8/31    COVID/PAC    Pt will call Luiz to schedule.    Sincerely,    Dejan Dent MD      Again, thank you for allowing me to participate in the care of your patient.      Sincerely,    Dejan Dent MD

## 2020-08-21 NOTE — TELEPHONE ENCOUNTER
Patient seen with Dr. Dejan Dent today, calling to schedule umbilical herniorrhaphy, ASC.   Scheduled 8/28 at 9:00 a.m., to arrive 7:30 a.m. 5th floor [address] [place] [address].   PAC scheduled 8/25 at 3 p.m., 5th [address].  Discussed COVID testing.

## 2020-08-21 NOTE — TELEPHONE ENCOUNTER
FUTURE VISIT INFORMATION      SURGERY INFORMATION:    Date: 8/28/20    [facility]    Surgeon:  Dejan Dent MD     Anesthesia Type:  general    Procedure: HERNIORRHAPHY, UMBILICAL, OPEN     Consult: ov 8/21    RECORDS REQUESTED FROM:       Most recent EKG+ Tracing: 3/24/20

## 2020-08-24 ENCOUNTER — TELEPHONE (OUTPATIENT)
Dept: SURGERY | Facility: CLINIC | Age: 25
End: 2020-08-24

## 2020-08-24 NOTE — TELEPHONE ENCOUNTER
Called patient to discuss surgery date. My last note states 8/28 but the surgery is actually on 8/31 at 9 a.m at the ASC. Left VM message with clarification. Patient has my direct call back number. Did say COVID testing date would need to be moved, currently scheduled 8/25.

## 2020-08-24 NOTE — TELEPHONE ENCOUNTER
Spoke with patient who states he spoke with his insurance company and was told the hernia repair needs prior authorization. Insurance is [facility]. Called Provider Services [phone]. Spoke with Jonny WEAVER who states CPT code 11859 for open umbilical hernia repair does not require a prior authorization.

## 2020-08-24 NOTE — TELEPHONE ENCOUNTER
.Call reference number for documentation of procedure not needing prior authorization is [id number].

## 2020-08-25 ENCOUNTER — PRE VISIT (OUTPATIENT)
Dept: SURGERY | Facility: CLINIC | Age: 25
End: 2020-08-25

## 2020-08-25 ENCOUNTER — ANESTHESIA EVENT (OUTPATIENT)
Dept: SURGERY | Facility: AMBULATORY SURGERY CENTER | Age: 25
End: 2020-08-25

## 2020-08-25 ENCOUNTER — VIRTUAL VISIT (OUTPATIENT)
Dept: SURGERY | Facility: CLINIC | Age: 25
End: 2020-08-25
Payer: COMMERCIAL

## 2020-08-25 ENCOUNTER — HOSPITAL ENCOUNTER (OUTPATIENT)
Facility: CLINIC | Age: 25
Setting detail: SPECIMEN
Discharge: HOME | End: 2020-08-25
Admitting: PHYSICIAN ASSISTANT
Payer: COMMERCIAL

## 2020-08-25 VITALS — HEIGHT: 73 IN | WEIGHT: 160 LBS | BODY MASS INDEX: 21.2 KG/M2

## 2020-08-25 DIAGNOSIS — K42.9 UMBILICAL HERNIA WITHOUT OBSTRUCTION AND WITHOUT GANGRENE: ICD-10-CM

## 2020-08-25 DIAGNOSIS — N45.1 EPIDIDYMITIS, RIGHT: ICD-10-CM

## 2020-08-25 DIAGNOSIS — Z01.818 PRE-OP EXAMINATION: Primary | ICD-10-CM

## 2020-08-25 DIAGNOSIS — R30.0 DYSURIA: ICD-10-CM

## 2020-08-25 LAB
ALBUMIN UR-MCNC: NEGATIVE MG/DL
ANION GAP SERPL CALCULATED.3IONS-SCNC: 5 MMOL/L (ref 3–14)
APPEARANCE UR: CLEAR
BILIRUB UR QL STRIP: NEGATIVE
BUN SERPL-MCNC: 16 MG/DL (ref 7–30)
CALCIUM SERPL-MCNC: 8.6 MG/DL (ref 8.5–10.1)
CHLORIDE SERPL-SCNC: 103 MMOL/L (ref 94–109)
CO2 SERPL-SCNC: 28 MMOL/L (ref 20–32)
COLOR UR AUTO: COLORLESS
CREAT SERPL-MCNC: 1.36 MG/DL (ref 0.66–1.25)
ERYTHROCYTE [DISTWIDTH] IN BLOOD BY AUTOMATED COUNT: 11.9 % (ref 10–15)
GFR SERPL CREATININE-BSD FRML MDRD: 72 ML/MIN/{1.73_M2}
GLUCOSE SERPL-MCNC: 101 MG/DL (ref 70–99)
GLUCOSE UR STRIP-MCNC: NEGATIVE MG/DL
HCT VFR BLD AUTO: 43.7 % (ref 40–53)
HGB BLD-MCNC: 14.6 G/DL (ref 13.3–17.7)
HGB UR QL STRIP: NEGATIVE
KETONES UR STRIP-MCNC: NEGATIVE MG/DL
LEUKOCYTE ESTERASE UR QL STRIP: NEGATIVE
MCH RBC QN AUTO: 31 PG (ref 26.5–33)
MCHC RBC AUTO-ENTMCNC: 33.4 G/DL (ref 31.5–36.5)
MCV RBC AUTO: 93 FL (ref 78–100)
MUCOUS THREADS #/AREA URNS LPF: PRESENT /LPF
NITRATE UR QL: NEGATIVE
PH UR STRIP: 6 PH (ref 5–7)
PLATELET # BLD AUTO: 201 10E9/L (ref 150–450)
POTASSIUM SERPL-SCNC: 3.8 MMOL/L (ref 3.4–5.3)
RBC # BLD AUTO: 4.71 10E12/L (ref 4.4–5.9)
RBC #/AREA URNS AUTO: <1 /HPF (ref 0–2)
SODIUM SERPL-SCNC: 136 MMOL/L (ref 133–144)
SOURCE: ABNORMAL
SP GR UR STRIP: 1 (ref 1–1.03)
UROBILINOGEN UR STRIP-MCNC: 0 MG/DL (ref 0–2)
WBC # BLD AUTO: 4.5 10E9/L (ref 4–11)
WBC #/AREA URNS AUTO: 0 /HPF (ref 0–5)

## 2020-08-25 PROCEDURE — 87086 URINE CULTURE/COLONY COUNT: CPT | Performed by: PHYSICIAN ASSISTANT

## 2020-08-25 RX ORDER — IBUPROFEN 200 MG
200 TABLET ORAL EVERY 4 HOURS PRN
COMMUNITY

## 2020-08-25 RX ORDER — TRIAMCINOLONE ACETONIDE 55 UG/1
1 SPRAY, METERED NASAL EVERY MORNING
COMMUNITY

## 2020-08-25 SDOH — HEALTH STABILITY: MENTAL HEALTH: HOW MANY STANDARD DRINKS CONTAINING ALCOHOL DO YOU HAVE ON A TYPICAL DAY?: 1 OR 2

## 2020-08-25 SDOH — HEALTH STABILITY: MENTAL HEALTH: HOW OFTEN DO YOU HAVE A DRINK CONTAINING ALCOHOL?: 2-3 TIMES A WEEK

## 2020-08-25 ASSESSMENT — MIFFLIN-ST. JEOR: SCORE: 1764.64

## 2020-08-25 ASSESSMENT — ENCOUNTER SYMPTOMS: SEIZURES: 0

## 2020-08-25 ASSESSMENT — PAIN SCALES - GENERAL: PAINLEVEL: MILD PAIN (2)

## 2020-08-25 NOTE — PROGRESS NOTES
"Jacob Bolaños is a 25 year old male who is being evaluated via a billable video visit.      The patient has been notified of following:     \"This video visit will be conducted via a call between you and your physician/provider. We have found that certain health care needs can be provided without the need for an in-person physical exam.  This service lets us provide the care you need with a video conversation.  If a prescription is necessary we can send it directly to your pharmacy.  If lab work is needed we can place an order for that and you can then stop by our lab to have the test done at a later time.    Video visits are billed at different rates depending on your insurance coverage.  Please reach out to your insurance provider with any questions.    If during the course of the call the physician/provider feels a video visit is not appropriate, you will not be charged for this service.\"    Patient has given verbal consent for Video visit? Yes  How would you like to obtain your AVS? Cresenciohart  If you are dropped from the video visit, the video invite should be resent to: Other e-mail: [email or web address]  Will anyone else be joining your video visit? No          Israel Duran      "

## 2020-08-25 NOTE — PATIENT INSTRUCTIONS
Preparing for Your Surgery      Name:  Jacob Bolaños   MRN:  [id number]   :  1995   Today's Date:  2020       Arriving for surgery:  Surgery date:  20  Arrival time:  07:20 am    Restrictions due to COVID 19:  Patients are allowed one visitor in the pre-op period  All visitors must wear a mask  No visitors under 18  No ill visitors   parking is not available     Please come to:   [facility]  [address] [place] [id number]    -  Proceed to the 5th floor to check into the Ambulatory Surgery Center.              >> There will be patient concierges on the 1st and 5th floor, for assistance or an escort, if you would like.              >> Please call [phone] with any questions.    What can I eat or drink?  -  You may eat and drink normally for up to 8 hours before your surgery.   -  You may have clear liquids until 4 hours before surgery.  Examples of clear liquids:  Water  Clear broth  Juices (apple, white grape, white cranberry  and cider) without pulp  Noncarbonated, powder based beverages  (lemonade and Onofre-Aid)  Sodas (Sprite, 7-Up, ginger ale and seltzer)  Coffee or tea (without milk or cream)  Gatorade    -  No Alcohol for at least 24 hours before surgery     Which medicines can I take?    Hold Aspirin for 7 days before surgery.   Hold Multivitamins for 7 days before surgery.  Hold Supplements for 7 days before surgery.  Hold Ibuprofen (Advil, Motrin) for 1 day before surgery--unless otherwise directed by surgeon.  Hold Naproxen (Aleve) for 4 days before surgery.  -  PLEASE TAKE these medications the day of surgery:  Tylenol if needed; finish Bactrim course of antibiotic.    How do I prepare myself?  - Please take 2 showers before surgery using Scrubcare or Hibiclens soap.    Use this soap only from the neck to your toes.     Leave the soap on your skin for one minute--then rinse thoroughly.      You may use your own shampoo and conditioner;  no other hair products.   - Please remove all jewelry and body piercings.  - No lotions, deodorants or fragrance.  - No makeup or fingernail polish.   - Bring your ID and insurance card.    - All patients are required to have a Covid-19 test within 4 days of surgery/procedure.      -Patients will be contacted by the [facility] scheduling team within 1 week of surgery to make an appointment.      - Patients may call the Scheduling team at [phone] if they have not been scheduled within 4 days of  surgery.      ALL PATIENTS GOING HOME THE SAME DAY OF SURGERY ARE REQUIRED TO HAVE A RESPONSIBLE ADULT TO DRIVE AND BE IN ATTENDANCE WITH THEM FOR 24 HOURS FOLLOWING SURGERY     Questions or Concerns:    - For any questions regarding the day of surgery or your hospital stay, please contact the Pre Admission Nursing Office at [phone].       - If you have health changes between today and your surgery please call your surgeon.       For questions after surgery please call your surgeons office.

## 2020-08-25 NOTE — ANESTHESIA PREPROCEDURE EVALUATION
Anesthesia Pre-Procedure Evaluation    Patient: Alexey Bolaños   MRN:     [id number] Gender:   male   Age:    25 year old :      1995        Preoperative Diagnosis: Umbilical hernia without obstruction and without gangrene [K42.9]   Procedure(s):  HERNIORRHAPHY, UMBILICAL, OPEN     Results for ALEXEY BOLAÑOS (MRN [id number]) as of 2020 06:59   Ref. Range 2020 17:23   Sodium Latest Ref Range: 133 - 144 mmol/L 136   Potassium Latest Ref Range: 3.4 - 5.3 mmol/L 3.8   Chloride Latest Ref Range: 94 - 109 mmol/L 103   Carbon Dioxide Latest Ref Range: 20 - 32 mmol/L 28   Urea Nitrogen Latest Ref Range: 7 - 30 mg/dL 16   Creatinine Latest Ref Range: 0.66 - 1.25 mg/dL 1.36 (H)   GFR Estimate Latest Ref Range: >60 mL/min/1.73_m2 72   GFR Estimate If Black Latest Ref Range: >60 mL/min/1.73_m2 83   Calcium Latest Ref Range: 8.5 - 10.1 mg/dL 8.6   Anion Gap Latest Ref Range: 3 - 14 mmol/L 5   Glucose Latest Ref Range: 70 - 99 mg/dL 101 (H)   WBC Latest Ref Range: 4.0 - 11.0 10e9/L 4.5   Hemoglobin Latest Ref Range: 13.3 - 17.7 g/dL 14.6   Hematocrit Latest Ref Range: 40.0 - 53.0 % 43.7   Platelet Count Latest Ref Range: 150 - 450 10e9/L 201   RBC Count Latest Ref Range: 4.4 - 5.9 10e12/L 4.71   MCV Latest Ref Range: 78 - 100 fl 93   MCH Latest Ref Range: 26.5 - 33.0 pg 31.0   MCHC Latest Ref Range: 31.5 - 36.5 g/dL 33.4   RDW Latest Ref Range: 10.0 - 15.0 % 11.9   Creatinine elevated. Likely secondary to testicular pain. Patient will need to remain hydrated.   Preop Vitals    BP Readings from Last 3 Encounters:   20 110/68   20 97/62   20 122/52    Pulse Readings from Last 3 Encounters:   20 67   20 84   20 72      Resp Readings from Last 3 Encounters:   20 18   20 16    SpO2 Readings from Last 3 Encounters:   20 98%   20 99%   20 100%      Temp Readings from Last 1 Encounters:   20 97.8  F (36.6  C) (Oral)    Ht Readings from  "Last 1 Encounters:   08/25/20 1.854 m (6' 1\")      Wt Readings from Last 1 Encounters:   08/25/20 72.6 kg (160 lb)    Estimated body mass index is 21.11 kg/m  as calculated from the following:    Height as of this encounter: 1.854 m (6' 1\").    Weight as of this encounter: 72.6 kg (160 lb).     LDA:        Past Medical History:   Diagnosis Date     History of concussion      Scrotal pain      Uncomplicated asthma       Past Surgical History:   Procedure Laterality Date     APPENDECTOMY       EXTRACTION(S) DENTAL      wisdom teeth      HERNIA REPAIR Right       Allergies   Allergen Reactions     Zithromax [Azithromycin] Other (See Comments)     Stomachache        Anesthesia Evaluation     . Pt has had prior anesthetic. Type: General           ROS/MED HX    ENT/Pulmonary:     (+)Intermittent asthma Treatment: Inhaler prn,  , . .    Neurologic:     (+)other neuro concussion on 5/23/20 - no residual affects    (-) seizures, CVA, TIA and migraines   Cardiovascular:  - neg cardiovascular ROS   (+) ----. : . . . :. . Previous cardiac testing date:results:date: results:ECG reviewed date:3/24/20 results:Sinus rhythm date: results:          METS/Exercise Tolerance:  >4 METS   Hematologic:  - neg hematologic  ROS      (-) history of blood clots, anemia and History of Transfusion   Musculoskeletal:  - neg musculoskeletal ROS       GI/Hepatic:     (+) Other GI/Hepatic umbilical hernia      (-) GERD   Renal/Genitourinary:     (+) Other Renal/ Genitourinary, history of orchitis, scrotal and testicular pain       Endo:  - neg endo ROS       Psychiatric:  - neg psychiatric ROS       Infectious Disease:  - neg infectious disease ROS       Malignancy:      - no malignancy   Other:    (+) no H/O Chronic Pain,no other significant disability                        PHYSICAL EXAM:   Mental Status/Neuro: A/A/O   Airway: Facies: Feasible  Mallampati: I  Mouth/Opening: Full  TM distance: > 6 cm  Neck ROM: Full   Respiratory: Auscultation: " CTAB     Resp. Rate: Normal     Resp. Effort: Normal      CV: Rhythm: Regular  Rate: Age appropriate  Heart: Normal Sounds  Edema: None   Comments:      Dental: Normal Dentition                Assessment:   ASA SCORE: 2    H&P: History and physical reviewed and following examination; no interval change.   Smoking Status:  Non-Smoker/Unknown   NPO Status: NPO Appropriate     Plan:   Anes. Type:  General   Pre-Medication: Midazolam   Induction:  IV (Standard)   Airway: ETT; Oral   Access/Monitoring: PIV   Maintenance: Balanced     Postop Plan:   Postop Pain: Opioids  Postop Sedation/Airway: Not planned  Disposition: Outpatient     PONV Management:   Adult Risk Factors:, Non-Smoker, Postop Opioids   Prevention: Ondansetron     CONSENT: Direct conversation   Plan and risks discussed with: Patient   Blood Products: Consent Deferred (Minimal Blood Loss)                PAC Discussion and Assessment    ASA Classification: 2  Case is suitable for: ASC  Anesthetic techniques and relevant risks discussed: GA  Invasive monitoring and risk discussed:   Types:   Possibility and Risk of blood transfusion discussed:   NPO instructions given:   Additional anesthetic preparation and risks discussed:   Needs early admission to pre-op area:   Other:     PAC Resident/NP Anesthesia Assessment:  Jacob is a 25 year old man who is scheduled for HERNIORRHAPHY, UMBILICAL, OPEN on 8/31/20 by Dr. Dent in treatment of Umbilical hernia without obstruction and without gangrene.  PAC referral for risk assessment and optimization for anesthesia with comorbid conditions of asthma, history of concussion and testicular/scrotal pain:    Pre-operative considerations:  1.  Cardiac:  Functional status- METS >4, the patient runs, bikes, rock climbs and does martial arts.  Intermediate risk surgery with 0.9% (RCRI #) risk of major adverse cardiac event. The patient denies cardiac symptoms and has no cardiac diagnosis. He had an EKG on 3/24/20 which was  sinus rhythm. No further testing indicated.     2.  Pulm:  Airway feasible.  DARRON risk: Low (male)  ~ Asthma - the patient hasn't used his albuterol inhaler in over 10 years. He denies any respiratory symptoms.     3. Neuro: History of concussion on 5/23/20 - he denies any residual symptoms.     4. GI:  Risk of PONV score = 2.  If > 2, anti-emetic intervention recommended.  ~ Umbilical hernia - no ongoing issues with bowel movements.     5. : The patient reports a history of intermittent testicular/scrotal pain. He was seen virtually by the urology, China Delgado PA-C on 8/18/20. At that time he was started on antibiotics for 10 days. Given his recent STI testing and scrotal US completed those were not repeated and a CT of the abdomen was obtained. This showed a small fat containing umbilical hernia. He messaged his urology team yesterday with complaints of new burning with urination and an urinalysis was ordered for him. He remains on the Bactrim. Will await urinalysis results and recommendations from urology. Will get baseline labs and have alerted Dr. Dent about the patient's testicular pain.       VTE risk: 0.5%    **Please refer to the physical examination documented by the anesthesiologist in the anesthesia record on the day of surgery**      Patient is optimized and is acceptable candidate for the proposed procedure.  No further diagnostic evaluation is needed.     For further details of assessment, testing, and physical exam please see H and P completed on same date.    Florence Lucero PA-C          Mid-Level Provider/Resident: Florence Lucero PA-C  Date: 8/25/20  Time:     Attending Anesthesiologist Anesthesia Assessment:        Anesthesiologist:   Date:   Time:   Pass/Fail:   Disposition:     PAC Pharmacist Assessment:        Pharmacist:   Date:   Time:    MELISSA Carter MD  Staff Anesthesiologist  [phone]

## 2020-08-25 NOTE — H&P
Pre-Operative H & P     CC:  Preoperative exam to assess for increased cardiopulmonary risk while undergoing surgery and anesthesia.    Date of Encounter: 8/25/2020  Primary Care Physician:  System, Provider Not In     Reason for visit: pre operative examination, Umbilical hernia without obstruction and without gangrene    KATHERIN Bolaños is a 25 year old male who presents for pre-operative H & P in preparation for HERNIORRHAPHY, UMBILICAL, OPEN with Dr. Dent on 8/31/20 at [facility].     The patient is a 25 year old man who has a past medical history significant for scrotal pain, history of concussion and asthma. The patient has been followed by China Delgado PA-C for his testicular/scrotal pain and recently seen on 8/18/20 and started on Bactrim. He also complained of abdominal pain at that visit and a CT of the abdomen was obtained which showed a small fat containing umbilical hernia. He was referred to Dr. Dent and seen on 8/21/20. At that time they discussed his surgical treatment options and the patient has been scheduled as above. The patient does note that he has more testicular pain and burning with urination. He reached out to his urology team and plans to get a urinalysis done today.     History is obtained from the patient and chart review    Past Medical History  Past Medical History:   Diagnosis Date     History of concussion      Scrotal pain      Uncomplicated asthma        Past Surgical History  Past Surgical History:   Procedure Laterality Date     APPENDECTOMY       EXTRACTION(S) DENTAL      wisdom teeth      HERNIA REPAIR Right        Hx of Blood transfusions/reactions: denies     Hx of abnormal bleeding or anti-platelet use: none    Menstrual history: No LMP for male patient.:     Steroid use in the last year: none    Personal or FH with difficulty with Anesthesia:  denies    Prior to Admission Medications  Current Outpatient Medications   Medication Sig  Dispense Refill     ibuprofen (ADVIL/MOTRIN) 200 MG tablet Take 200 mg by mouth every 4 hours as needed for mild pain       sulfamethoxazole-trimethoprim (BACTRIM DS) 800-160 MG tablet Take 1 tablet by mouth 2 times daily for 10 days 20 tablet 0     ALBUTEROL IN        triamcinolone (NASACORT) 55 MCG/ACT nasal aerosol Spray 1 spray in nostril every morning         Allergies  Allergies   Allergen Reactions     Zithromax [Azithromycin] Other (See Comments)     Stomachache       Social History  Social History     Socioeconomic History     Marital status: Single     Spouse name: Not on file     Number of children: Not on file     Years of education: Not on file     Highest education level: Not on file   Occupational History     Not on file   Social Needs     Financial resource strain: Not on file     Food insecurity     Worry: Not on file     Inability: Not on file     Transportation needs     Medical: Not on file     Non-medical: Not on file   Tobacco Use     Smoking status: Never Smoker     Smokeless tobacco: Never Used   Substance and Sexual Activity     Alcohol use: Yes     Frequency: 2-3 times a week     Drinks per session: 1 or 2     Comment: socially - nothing for a couple of weeks      Drug use: Not Currently     Sexual activity: Yes     Partners: Female   Lifestyle     Physical activity     Days per week: Not on file     Minutes per session: Not on file     Stress: Not on file   Relationships     Social connections     Talks on phone: Not on file     Gets together: Not on file     Attends [employer] service: Not on file     Active member of club or organization: Not on file     Attends meetings of clubs or organizations: Not on file     Relationship status: Not on file     Intimate partner violence     Fear of current or ex partner: Not on file     Emotionally abused: Not on file     Physically abused: Not on file     Forced sexual activity: Not on file   Other Topics Concern     Not on file   Social History  "Narrative     Not on file       Family History  History reviewed. No pertinent family history.    Review of Systems  ROS/MED HX    ENT/Pulmonary:     (+)Intermittent asthma Treatment: Inhaler prn,  , . .    Neurologic:     (+)other neuro concussion on 5/23/20 - no residual affects    (-) seizures, CVA, TIA and migraines   Cardiovascular:  - neg cardiovascular ROS   (+) ----. : . . . :. . Previous cardiac testing date:results:date: results:ECG reviewed date:3/24/20 results:Sinus rhythm date: results:          METS/Exercise Tolerance:  >4 METS   Hematologic:  - neg hematologic  ROS      (-) history of blood clots, anemia and History of Transfusion   Musculoskeletal:  - neg musculoskeletal ROS       GI/Hepatic:     (+) Other GI/Hepatic umbilical hernia      (-) GERD   Renal/Genitourinary:     (+) Other Renal/ Genitourinary, history of orchitis, scrotal and testicular pain       Endo:  - neg endo ROS       Psychiatric:  - neg psychiatric ROS       Infectious Disease:  - neg infectious disease ROS       Malignancy:      - no malignancy   Other:    (+) no H/O Chronic Pain,no other significant disability        The complete review of systems is negative other than noted in the HPI or here.                         160 lbs 0 oz  6' 1\"   Body mass index is 21.11 kg/m .       Physical Exam  Constitutional: Awake, alert, cooperative, no apparent distress, and appears stated age.  Eyes: Pupils equal  HENT: Normocephalic   Respiratory: non labored breathing  Neurologic: Awake, alert, oriented to name, place and time.   Neuropsychiatric: Calm, cooperative. Normal affect.     Labs: (personally reviewed)  Results for ALEXEY MOLINA (MRN [id number]) as of 8/26/2020 06:59   Ref. Range 8/25/2020 17:23   Sodium Latest Ref Range: 133 - 144 mmol/L 136   Potassium Latest Ref Range: 3.4 - 5.3 mmol/L 3.8   Chloride Latest Ref Range: 94 - 109 mmol/L 103   Carbon Dioxide Latest Ref Range: 20 - 32 mmol/L 28   Urea Nitrogen Latest Ref " Range: 7 - 30 mg/dL 16   Creatinine Latest Ref Range: 0.66 - 1.25 mg/dL 1.36 (H)   GFR Estimate Latest Ref Range: >60 mL/min/1.73_m2 72   GFR Estimate If Black Latest Ref Range: >60 mL/min/1.73_m2 83   Calcium Latest Ref Range: 8.5 - 10.1 mg/dL 8.6   Anion Gap Latest Ref Range: 3 - 14 mmol/L 5   Glucose Latest Ref Range: 70 - 99 mg/dL 101 (H)   WBC Latest Ref Range: 4.0 - 11.0 10e9/L 4.5   Hemoglobin Latest Ref Range: 13.3 - 17.7 g/dL 14.6   Hematocrit Latest Ref Range: 40.0 - 53.0 % 43.7   Platelet Count Latest Ref Range: 150 - 450 10e9/L 201   RBC Count Latest Ref Range: 4.4 - 5.9 10e12/L 4.71   MCV Latest Ref Range: 78 - 100 fl 93   MCH Latest Ref Range: 26.5 - 33.0 pg 31.0   MCHC Latest Ref Range: 31.5 - 36.5 g/dL 33.4   RDW Latest Ref Range: 10.0 - 15.0 % 11.9   Creatinine elevated. Likely secondary to testicular pain. Patient will need to remain hydrated.     EKG: 3/24/20  Sinus rhythm     The patient's records and results personally reviewed by this provider.     Outside records reviewed from: care everywhere     ASSESSMENT and PLAN  Jacob is a 25 year old man who is scheduled for HERNIORRHAPHY, UMBILICAL, OPEN on 8/31/20 by Dr. Dent in treatment of Umbilical hernia without obstruction and without gangrene.  PAC referral for risk assessment and optimization for anesthesia with comorbid conditions of asthma, history of concussion and testicular/scrotal pain:    Pre-operative considerations:  1.  Cardiac:  Functional status- METS >4, the patient runs, bikes, rock climbs and does martial arts.  Intermediate risk surgery with 0.9% (RCRI #) risk of major adverse cardiac event. The patient denies cardiac symptoms and has no cardiac diagnosis. He had an EKG on 3/24/20 which was sinus rhythm. No further testing indicated.     2.  Pulm:  Airway feasible.  DARRON risk: Low (male)  ~ Asthma - the patient hasn't used his albuterol inhaler in over 10 years. He denies any respiratory symptoms.     3. Neuro: History of  concussion on 5/23/20 - he denies any residual symptoms.     4. GI:  Risk of PONV score = 2.  If > 2, anti-emetic intervention recommended.  ~ Umbilical hernia - no ongoing issues with bowel movements.     5. : The patient reports a history of intermittent testicular/scrotal pain. He was seen virtually by the urology, China Delgado PA-C on 8/18/20. At that time he was started on antibiotics for 10 days. Given his recent STI testing and scrotal US completed those were not repeated and a CT of the abdomen was obtained. This showed a small fat containing umbilical hernia. He messaged his urology team yesterday with complaints of new burning with urination and an urinalysis was ordered for him. He remains on the Bactrim. Will await urinalysis results and recommendations from urology. Will get baseline labs and have alerted Dr. Dent about the patient's testicular pain - heard back from Dr. Dent and as there will be no mesh placement for this surgery no concerns with proceeding given recent antibiotics with testicular pain.       VTE risk: 0.5%    **Please refer to the physical examination documented by the anesthesiologist in the anesthesia record on the day of surgery**      The patient is optimized for their procedure. AVS with information on surgery time/arrival time, meds and NPO status given by nursing staff.          Video-Visit Details    Type of service:  Video Visit    Patient verbally consented to video service today: YES      Video Start Time: 3:02  Video End Time (time video stopped): 3:14    Originating Location (pt. Location): Home    Distant Location (provider location):  [facility] PREOPERATIVE ASSESSMENT CENTER     Mode of Communication:  Video Conference via Paul Lucero PA-C  Preoperative Assessment Center  [facility]  Cl[facility] and [facility]  Phone: [phone]  Fax: [phone]

## 2020-08-26 LAB
BACTERIA SPEC CULT: NO GROWTH
Lab: NORMAL
SPECIMEN SOURCE: NORMAL

## 2020-08-27 DIAGNOSIS — Z11.59 ENCOUNTER FOR SCREENING FOR OTHER VIRAL DISEASES: ICD-10-CM

## 2020-08-27 RX ORDER — CEFAZOLIN SODIUM 2 G/50ML
2 SOLUTION INTRAVENOUS
Status: CANCELLED | OUTPATIENT
Start: 2020-08-27

## 2020-08-27 RX ORDER — CEFAZOLIN SODIUM 1 G/50ML
1 INJECTION, SOLUTION INTRAVENOUS SEE ADMIN INSTRUCTIONS
Status: CANCELLED | OUTPATIENT
Start: 2020-08-27

## 2020-08-28 LAB
SARS-COV-2 RNA SPEC QL NAA+PROBE: NOT DETECTED
SPECIMEN SOURCE: NORMAL

## 2020-08-31 ENCOUNTER — ANESTHESIA (OUTPATIENT)
Dept: SURGERY | Facility: AMBULATORY SURGERY CENTER | Age: 25
End: 2020-08-31

## 2020-08-31 ENCOUNTER — HOSPITAL ENCOUNTER (OUTPATIENT)
Facility: AMBULATORY SURGERY CENTER | Age: 25
End: 2020-08-31
Attending: SURGERY
Payer: COMMERCIAL

## 2020-08-31 VITALS
BODY MASS INDEX: 21.47 KG/M2 | OXYGEN SATURATION: 100 % | SYSTOLIC BLOOD PRESSURE: 108 MMHG | RESPIRATION RATE: 16 BRPM | HEART RATE: 77 BPM | TEMPERATURE: 97 F | HEIGHT: 73 IN | DIASTOLIC BLOOD PRESSURE: 60 MMHG | WEIGHT: 162 LBS

## 2020-08-31 DIAGNOSIS — K42.9 UMBILICAL HERNIA WITHOUT OBSTRUCTION AND WITHOUT GANGRENE: ICD-10-CM

## 2020-08-31 RX ORDER — SODIUM CHLORIDE, SODIUM LACTATE, POTASSIUM CHLORIDE, CALCIUM CHLORIDE 600; 310; 30; 20 MG/100ML; MG/100ML; MG/100ML; MG/100ML
INJECTION, SOLUTION INTRAVENOUS CONTINUOUS
Status: DISCONTINUED | OUTPATIENT
Start: 2020-08-31 | End: 2020-09-01 | Stop reason: HOSPADM

## 2020-08-31 RX ORDER — GLYCOPYRROLATE 0.2 MG/ML
INJECTION, SOLUTION INTRAMUSCULAR; INTRAVENOUS PRN
Status: DISCONTINUED | OUTPATIENT
Start: 2020-08-31 | End: 2020-08-31

## 2020-08-31 RX ORDER — PROPOFOL 10 MG/ML
INJECTION, EMULSION INTRAVENOUS PRN
Status: DISCONTINUED | OUTPATIENT
Start: 2020-08-31 | End: 2020-08-31

## 2020-08-31 RX ORDER — FENTANYL CITRATE 50 UG/ML
25-50 INJECTION, SOLUTION INTRAMUSCULAR; INTRAVENOUS EVERY 5 MIN PRN
Status: DISCONTINUED | OUTPATIENT
Start: 2020-08-31 | End: 2020-08-31 | Stop reason: HOSPADM

## 2020-08-31 RX ORDER — DEXAMETHASONE SODIUM PHOSPHATE 4 MG/ML
INJECTION, SOLUTION INTRA-ARTICULAR; INTRALESIONAL; INTRAMUSCULAR; INTRAVENOUS; SOFT TISSUE PRN
Status: DISCONTINUED | OUTPATIENT
Start: 2020-08-31 | End: 2020-08-31

## 2020-08-31 RX ORDER — FENTANYL CITRATE 50 UG/ML
INJECTION, SOLUTION INTRAMUSCULAR; INTRAVENOUS PRN
Status: DISCONTINUED | OUTPATIENT
Start: 2020-08-31 | End: 2020-08-31

## 2020-08-31 RX ORDER — LIDOCAINE 40 MG/G
CREAM TOPICAL
Status: DISCONTINUED | OUTPATIENT
Start: 2020-08-31 | End: 2020-08-31 | Stop reason: HOSPADM

## 2020-08-31 RX ORDER — KETOROLAC TROMETHAMINE 30 MG/ML
INJECTION, SOLUTION INTRAMUSCULAR; INTRAVENOUS PRN
Status: DISCONTINUED | OUTPATIENT
Start: 2020-08-31 | End: 2020-08-31

## 2020-08-31 RX ORDER — MEPERIDINE HYDROCHLORIDE 25 MG/ML
12.5 INJECTION INTRAMUSCULAR; INTRAVENOUS; SUBCUTANEOUS
Status: DISCONTINUED | OUTPATIENT
Start: 2020-08-31 | End: 2020-09-01 | Stop reason: HOSPADM

## 2020-08-31 RX ORDER — SODIUM CHLORIDE, SODIUM LACTATE, POTASSIUM CHLORIDE, CALCIUM CHLORIDE 600; 310; 30; 20 MG/100ML; MG/100ML; MG/100ML; MG/100ML
INJECTION, SOLUTION INTRAVENOUS CONTINUOUS
Status: DISCONTINUED | OUTPATIENT
Start: 2020-08-31 | End: 2020-08-31 | Stop reason: HOSPADM

## 2020-08-31 RX ORDER — OXYCODONE HYDROCHLORIDE 5 MG/1
5 TABLET ORAL EVERY 6 HOURS PRN
Qty: 12 TABLET | Refills: 0 | Status: SHIPPED | OUTPATIENT
Start: 2020-08-31 | End: 2020-09-25

## 2020-08-31 RX ORDER — GABAPENTIN 300 MG/1
300 CAPSULE ORAL ONCE
Status: COMPLETED | OUTPATIENT
Start: 2020-08-31 | End: 2020-08-31

## 2020-08-31 RX ORDER — CEFAZOLIN SODIUM 2 G/50ML
2 SOLUTION INTRAVENOUS
Status: COMPLETED | OUTPATIENT
Start: 2020-08-31 | End: 2020-08-31

## 2020-08-31 RX ORDER — OXYCODONE HYDROCHLORIDE 5 MG/1
5 TABLET ORAL EVERY 4 HOURS PRN
Status: DISCONTINUED | OUTPATIENT
Start: 2020-08-31 | End: 2020-09-01 | Stop reason: HOSPADM

## 2020-08-31 RX ORDER — PROPOFOL 10 MG/ML
INJECTION, EMULSION INTRAVENOUS CONTINUOUS PRN
Status: DISCONTINUED | OUTPATIENT
Start: 2020-08-31 | End: 2020-08-31

## 2020-08-31 RX ORDER — ONDANSETRON 2 MG/ML
INJECTION INTRAMUSCULAR; INTRAVENOUS PRN
Status: DISCONTINUED | OUTPATIENT
Start: 2020-08-31 | End: 2020-08-31

## 2020-08-31 RX ORDER — ONDANSETRON 4 MG/1
4 TABLET, ORALLY DISINTEGRATING ORAL EVERY 30 MIN PRN
Status: DISCONTINUED | OUTPATIENT
Start: 2020-08-31 | End: 2020-09-01 | Stop reason: HOSPADM

## 2020-08-31 RX ORDER — ONDANSETRON 2 MG/ML
4 INJECTION INTRAMUSCULAR; INTRAVENOUS EVERY 30 MIN PRN
Status: DISCONTINUED | OUTPATIENT
Start: 2020-08-31 | End: 2020-09-01 | Stop reason: HOSPADM

## 2020-08-31 RX ORDER — NALOXONE HYDROCHLORIDE 0.4 MG/ML
.1-.4 INJECTION, SOLUTION INTRAMUSCULAR; INTRAVENOUS; SUBCUTANEOUS
Status: DISCONTINUED | OUTPATIENT
Start: 2020-08-31 | End: 2020-09-01 | Stop reason: HOSPADM

## 2020-08-31 RX ORDER — ACETAMINOPHEN 325 MG/1
975 TABLET ORAL ONCE
Status: COMPLETED | OUTPATIENT
Start: 2020-08-31 | End: 2020-08-31

## 2020-08-31 RX ORDER — LIDOCAINE HYDROCHLORIDE 20 MG/ML
INJECTION, SOLUTION INFILTRATION; PERINEURAL PRN
Status: DISCONTINUED | OUTPATIENT
Start: 2020-08-31 | End: 2020-08-31

## 2020-08-31 RX ORDER — CEFAZOLIN SODIUM 1 G/50ML
1 SOLUTION INTRAVENOUS SEE ADMIN INSTRUCTIONS
Status: DISCONTINUED | OUTPATIENT
Start: 2020-08-31 | End: 2020-08-31 | Stop reason: HOSPADM

## 2020-08-31 RX ADMIN — PROPOFOL 200 MG: 10 INJECTION, EMULSION INTRAVENOUS at 09:11

## 2020-08-31 RX ADMIN — GLYCOPYRROLATE 0.2 MG: 0.2 INJECTION, SOLUTION INTRAMUSCULAR; INTRAVENOUS at 09:06

## 2020-08-31 RX ADMIN — OXYCODONE HYDROCHLORIDE 5 MG: 5 TABLET ORAL at 10:59

## 2020-08-31 RX ADMIN — KETOROLAC TROMETHAMINE 30 MG: 30 INJECTION, SOLUTION INTRAMUSCULAR; INTRAVENOUS at 09:06

## 2020-08-31 RX ADMIN — SODIUM CHLORIDE, SODIUM LACTATE, POTASSIUM CHLORIDE, CALCIUM CHLORIDE: 600; 310; 30; 20 INJECTION, SOLUTION INTRAVENOUS at 07:58

## 2020-08-31 RX ADMIN — ACETAMINOPHEN 975 MG: 325 TABLET ORAL at 07:58

## 2020-08-31 RX ADMIN — GABAPENTIN 300 MG: 300 CAPSULE ORAL at 07:58

## 2020-08-31 RX ADMIN — DEXAMETHASONE SODIUM PHOSPHATE 4 MG: 4 INJECTION, SOLUTION INTRA-ARTICULAR; INTRALESIONAL; INTRAMUSCULAR; INTRAVENOUS; SOFT TISSUE at 09:06

## 2020-08-31 RX ADMIN — FENTANYL CITRATE 50 MCG: 50 INJECTION, SOLUTION INTRAMUSCULAR; INTRAVENOUS at 09:06

## 2020-08-31 RX ADMIN — GLYCOPYRROLATE 0.2 MG: 0.2 INJECTION, SOLUTION INTRAMUSCULAR; INTRAVENOUS at 09:24

## 2020-08-31 RX ADMIN — PROPOFOL 200 MCG/KG/MIN: 10 INJECTION, EMULSION INTRAVENOUS at 09:11

## 2020-08-31 RX ADMIN — ONDANSETRON 4 MG: 2 INJECTION INTRAMUSCULAR; INTRAVENOUS at 09:06

## 2020-08-31 RX ADMIN — CEFAZOLIN SODIUM 2 G: 2 SOLUTION INTRAVENOUS at 09:13

## 2020-08-31 RX ADMIN — LIDOCAINE HYDROCHLORIDE 50 MG: 20 INJECTION, SOLUTION INFILTRATION; PERINEURAL at 09:10

## 2020-08-31 ASSESSMENT — MIFFLIN-ST. JEOR: SCORE: 1773.71

## 2020-08-31 NOTE — ANESTHESIA CARE TRANSFER NOTE
Patient: Jacob Bolaños    Procedure(s):  HERNIORRHAPHY, UMBILICAL, OPEN    Diagnosis: Umbilical hernia without obstruction and without gangrene [K42.9]  Diagnosis Additional Information: No value filed.    Anesthesia Type:   General     Note:  Airway :Room Air  Patient transferred to:PACU  Comments: Uneventful transport to PACU; VSS; Report given to RN; Pt comfortable; IV patent: pt exchanging wellHandoff Report: Identifed the Patient, Identified the Reponsible Provider, Reviewed the pertinent medical history, Discussed the surgical course, Reviewed Intra-OP anesthesia mangement and issues during anesthesia, Set expectations for post-procedure period and Allowed opportunity for questions and acknowledgement of understanding      Vitals: (Last set prior to Anesthesia Care Transfer)    CRNA VITALS  8/31/2020 0930 - 8/31/2020 1007      8/31/2020             Pulse:  69    SpO2:  99 %    Resp Rate (observed):  (!) 4                Electronically Signed By: ROHIT Berman CRNA  August 31, 2020  10:07 AM

## 2020-08-31 NOTE — DISCHARGE INSTRUCTIONS
[facility]  Home Care Following Anesthesia  For 24 hours after surgery:  1. Get plenty of rest.  A responsible adult must stay with you for at least 24 hours after you leave the surgery center.  2. Do not drive or use heavy equipment.  If you have weakness or tingling, don't drive or use heavy equipment until this feeling goes away.   3. Do not drink alcohol.   4. Avoid strenuous or risky activities.  Ask for help when climbing stairs.  5. You may feel lightheaded.  IF so, sit for a few minutes before standing.  Have someone help you get up.   6. If you have nausea (feel sick to your stomach): Drink only clear liquids such as apple juice, ginger ale, broth or 7-Up.  Rest may also help.  Be sure to drink enough fluids.  Move to a regular diet as you feel able.   7. You may have a slight fever.  Call the doctor if your fever is over 100 F (37.7 C) (taken under the tongue) or lasts longer than 24 hours.  8. You may have a dry mouth, a sore throat, muscle aches or trouble sleeping. These should go away after 24 hours.  9. Do not make important or legal decisions.               Tips for taking pain medications  To get the best pain relief possible, remember these points:    Take pain medications as directed, before pain becomes severe.    Pain medication can upset your stomach: taking it with food may help.    Constipation is a common side effect of pain medication. Drink plenty of  fluids.    Eat foods high in fiber. Take a stool softener if recommended by your doctor or pharmacist.    Do not drink alcohol, drive or operate machinery while taking pain medications.    Ask about other ways to control pain, such as with heat, ice or relaxation.    Tylenol/Acetaminophen Consumption  To help encourage the safe use of acetaminophen, the makers of TYLENOL  have lowered the maximum daily dose for single-ingredient Extra Strength TYLENOL  (acetaminophen) products sold in the [place] from 8  pills per day (4,000 mg) to 6 pills per day (3,000 mg). The dosing interval has also changed from 2 pills every 4-6 hours to 2 pills every 6 hours.    If you feel your pain relief is insufficient, you may take Tylenol/Acetaminophen in addition to your narcotic pain medication.     Be careful not to exceed 3,000 mg of Tylenol/Acetaminophen in a 24 hour period from all sources.    If you are taking extra strength Tylenol/acetaminophen (500 mg), the maximum dose is 6 tablets in 24 hours.    If you are taking regular strength acetaminophen (325 mg), the maximum dose is 9 tablets in 24 hours.    You were given 975 mg of Tylenol at 8 a.m. You can take additional Tylenol any time after 2 p.m. this afternoon      Call a doctor for any of the followin. Signs of infection (fever, growing tenderness at the surgery site, a large amount of drainage or bleeding, severe pain, foul-smelling drainage, redness, swelling).  2. It has been over 8 to 10 hours since surgery and you are still not able to urinate (pass water).  3. Headache for over 24 hours.  4. Signs of Covid-19 infection (temperature over 100 degrees, shortness of breath, cough, loss of taste/smell, generalized body aches, persistent headache, chills, sore throat, nausea/vomiting/diarrhea)    Your doctor is:       Dr. Dejan Dent, General Surgery: [phone]               Or dial [phone] and ask for the resident on call for:  General Surgery  For emergency care, call the:  [facility]:  [phone] (TTY for hearing impaired: [phone])

## 2020-08-31 NOTE — PROGRESS NOTES
Saw and evaluated patient. Plan for umbilical hernia repair. Explained risks and benefits- pt wishes to proceed.      Dejan Dent MD

## 2020-08-31 NOTE — ANESTHESIA POSTPROCEDURE EVALUATION
Anesthesia POST Procedure Evaluation    Patient: Jacob Bolaños   MRN:     [id number] Gender:   male   Age:    25 year old :      1995        Preoperative Diagnosis: Umbilical hernia without obstruction and without gangrene [K42.9]   Procedure(s):  HERNIORRHAPHY, UMBILICAL, OPEN   Postop Comments: No value filed.     Anesthesia Type: General       Disposition: Outpatient   Postop Pain Control: Uneventful            Sign Out: Well controlled pain   PONV: No   Neuro/Psych: Uneventful            Sign Out: Acceptable/Baseline neuro status   Airway/Respiratory: Uneventful            Sign Out: Acceptable/Baseline resp. status   CV/Hemodynamics: Uneventful            Sign Out: Acceptable CV status   Other NRE: NONE   DID A NON-ROUTINE EVENT OCCUR? No         Last Anesthesia Record Vitals:  CRNA VITALS  2020 0930 - 2020 1030      2020             Pulse:  69    SpO2:  99 %    Resp Rate (observed):  (!) 4          Last PACU Vitals:  Vitals Value Taken Time   /51 2020 10:30 AM   Temp 36.1  C (96.9  F) 2020 10:30 AM   Pulse 68 2020 10:30 AM   Resp 19 2020 10:30 AM   SpO2 98 % 2020 10:30 AM   Temp src     NIBP     Pulse     SpO2     Resp     Temp     Ht Rate     Temp 2     Vitals shown include unvalidated device data.      Electronically Signed By: Buck Carlos MD, 2020, 11:18 AM

## 2020-08-31 NOTE — OP NOTE
[facility]    Operative Note    Pre-operative diagnosis: Umbilical hernia without obstruction and without gangrene [K42.9]   Post-operative diagnosis same*   Procedure: Procedure(s):  HERNIORRHAPHY, UMBILICAL, OPEN   Surgeon: Surgeon(s) and Role:     * Dejan Dent MD - Primary; Assistant- Froilan Tee MD- Resident   Anesthesia: General    Estimated blood loss: Minimal   Drains: None   Specimens: * No specimens in log *   Findings: small fat containing umbilical hernia <1cm.  There was an umbilical hernia measuring <1 cm and this contained  omentum.  Bowel was not involved.  .     Complications: None.   Implants: None.  Name of mesh: na  Size of mesh: na       COMORBIDITIES:   Past Medical History:   Diagnosis Date     History of concussion      Scrotal pain      Uncomplicated asthma        INDICATIONS FOR PROCEDURE  Jacob Bolaños is a 25 year old male who developed an umbilical hernia associated with pain.    After understanding the risks and benefits of proceeding with a open umbilical hernia repair, he agreed to an operation.    General risks related to abdominal surgery were reviewed with the patient.    These include, but are not limited to, death, myocardial infarction, pneumonia, urinary tract infection, deep venous thrombosis with or without pulmonary embolus, abdominal infection from bowel injury or abscess, bowel obstruction, wound infection, and bleeding.    Risks related to hernia repair were also discussed and these specifically include the risk of recurrence, chronic abdominal wall pain, seroma, and wound and mesh infection.    OPERATIVE PROCEDURE:  Under the benefits of general endotracheal anesthesia a 3cm incision was made in a curvilinear crease at the infraumbilical location.    A complete dissection at the umbilical stalk was performed using clamps and cautery.  Then the hernia opening was revealed by opening the peritoneum.  Fat  "was involved in the hernia and this was reduced into the abdomen.    The umbilical hernia defect size are as described in the findings above.    .    The umbilical hernia defect was closed using a figure of eight suture of O vicryl suture    Next, the fascia was inspected and found to be intact.    Hemostasis was achieved.    Next, a 3-0 suture of vicryl was used to tack the underside of the umbilical skin to the fascia to re-create an \"innie\" umbilicus. Additional deep space was closed using 3-0 vicryl deep dermal interrupteds.    The skin was closed using 4-0 absorbable suture and skin glue was applied.    WOund was infiltrated with 20 ml local (half and half mix of one percent lido and quarter percent bupivicaine with epi)    Sterile dressings were applied.  The patient tolerated the procedure well.       I was scrubbed for all critical components of the operation.    All sponge and needle counts were correct x 2 at the end of the procedure.    Dejan Dent MD              "

## 2020-09-08 ENCOUNTER — PRE VISIT (OUTPATIENT)
Dept: UROLOGY | Facility: CLINIC | Age: 25
End: 2020-09-08

## 2020-09-21 ENCOUNTER — MYC MEDICAL ADVICE (OUTPATIENT)
Dept: SURGERY | Facility: CLINIC | Age: 25
End: 2020-09-21

## 2020-09-21 NOTE — TELEPHONE ENCOUNTER
"Patient has been taking it easy for the last few weeks.  This morning he noticed the surgery site looks a little swollen and some pain above the surgery site.  Has been doing some light stretching.  He has been walking.  Has had \"light\" intercourse with his partner and isn't sure if that aggravated the area.      Discussed lifting restrictions with patient.  Encouraged patient to decrease his activity to make sure he isn't twisting or straining the surgical area.  Encouraged ice for the next couple of days and then to try heat.  Can use ibuprofen or Tyelenol for pain control.  To call the office if pain/swelling worsen.  "

## 2020-09-25 ENCOUNTER — OFFICE VISIT (OUTPATIENT)
Dept: SURGERY | Facility: CLINIC | Age: 25
End: 2020-09-25
Payer: COMMERCIAL

## 2020-09-25 VITALS
TEMPERATURE: 98.6 F | SYSTOLIC BLOOD PRESSURE: 122 MMHG | WEIGHT: 163.8 LBS | OXYGEN SATURATION: 100 % | DIASTOLIC BLOOD PRESSURE: 67 MMHG | BODY MASS INDEX: 21.71 KG/M2 | HEART RATE: 61 BPM | HEIGHT: 73 IN

## 2020-09-25 DIAGNOSIS — K42.9 UMBILICAL HERNIA WITHOUT OBSTRUCTION AND WITHOUT GANGRENE: Primary | ICD-10-CM

## 2020-09-25 ASSESSMENT — PAIN SCALES - GENERAL: PAINLEVEL: MODERATE PAIN (5)

## 2020-09-25 ASSESSMENT — MIFFLIN-ST. JEOR: SCORE: 1781.87

## 2020-09-25 NOTE — LETTER
"9/25/2020     RE: Jacob Bolaños  [address]  [place] [address]     Dear Colleague,    Thank you for referring your patient, Jacob Bolaños, to the [facility] GENERAL SURGERY at [facility]. Please see a copy of my visit note below.    Patient underwent prior oepn UHR surgery and this occurred 4 weeks ago.    Jacob Bolaños overall has the following complaints: had fully recovered, now with some discomfort at surgical site. No sign of infection.     On exam:  /67 (BP Location: Left arm, Patient Position: Sitting, Cuff Size: Adult Large)   Pulse 61   Temp 98.6  F (37  C) (Oral)   Ht 1.854 m (6' 1\")   Wt 74.3 kg (163 lb 12.8 oz)   SpO2 100%   BMI 21.61 kg/m    Lung exam: breathing unlabored  Abdominal exam: soft; nontender; nondistended; incisions c/d/i    Plan:  -Wound looks good  -NO hernia felt, no seroma/hematoma  -Provided reassurance    -RTC prn      Dejan Dent MD    "

## 2020-09-25 NOTE — NURSING NOTE
"Chief Complaint   Patient presents with     Surgical Followup     Umbilical hernia reapair post-op follow up.       Vitals:    09/25/20 1259   BP: 122/67   BP Location: Left arm   Patient Position: Sitting   Cuff Size: Adult Large   Pulse: 61   Temp: 98.6  F (37  C)   TempSrc: Oral   SpO2: 100%   Weight: 74.3 kg (163 lb 12.8 oz)   Height: 1.854 m (6' 1\")       Body mass index is 21.61 kg/m .         Mandy Ferreira CMA    "

## 2020-09-26 NOTE — PROGRESS NOTES
"Patient underwent prior oepn UHR surgery and this occurred 4 weeks ago.    Jacob Bolaños overall has the following complaints: had fully recovered, now with some discomfort at surgical site. No sign of infection.     On exam:  /67 (BP Location: Left arm, Patient Position: Sitting, Cuff Size: Adult Large)   Pulse 61   Temp 98.6  F (37  C) (Oral)   Ht 1.854 m (6' 1\")   Wt 74.3 kg (163 lb 12.8 oz)   SpO2 100%   BMI 21.61 kg/m    Lung exam: breathing unlabored  Abdominal exam: soft; nontender; nondistended; incisions c/d/i    Plan:  -Wound looks good  -NO hernia felt, no seroma/hematoma  -Provided reassurance    -RTC prn      Dejan Dent MD                "

## 2020-09-29 ENCOUNTER — OFFICE VISIT (OUTPATIENT)
Dept: UROLOGY | Facility: CLINIC | Age: 25
End: 2020-09-29
Payer: COMMERCIAL

## 2020-09-29 VITALS
WEIGHT: 165 LBS | DIASTOLIC BLOOD PRESSURE: 64 MMHG | HEART RATE: 59 BPM | BODY MASS INDEX: 21.87 KG/M2 | HEIGHT: 73 IN | SYSTOLIC BLOOD PRESSURE: 117 MMHG

## 2020-09-29 DIAGNOSIS — N50.82 SCROTAL PAIN: Primary | ICD-10-CM

## 2020-09-29 ASSESSMENT — PAIN SCALES - GENERAL: PAINLEVEL: NO PAIN (0)

## 2020-09-29 ASSESSMENT — MIFFLIN-ST. JEOR: SCORE: 1787.32

## 2020-09-29 NOTE — LETTER
"9/29/2020       RE: Jacob Bolaños  [address]  [facility] [address]     Dear Colleague,    Thank you for referring your patient, Jacob Bolaños, to the [facility] UROLOGY AND INST FOR PROSTATE AND UROLOGIC CANCERS at [facility]. Please see a copy of my visit note below.    Urology Office Visit - Follow-up    Reason for visit: follow up for scrotal pain    HPI: Mr. Jacob Bolaños is a 25 year old male who presents today for follow up of scrotal pain. He was seen in initial consultation via virtual visit on 8/18/2020 - please see note from this date for full history. In brief, he has had intermittent right-sided scrotal pain since the Fall of 2019. Scrotal ultrasound in Jan 2020 was normal. He does describe a history of a right-sided testicle injury (punched in the area) leading to a right inguinal hernia s/p repair in 2006. Also with some abdominal pain and a bulge located close to his umbilicus. CT abd/pelv w/contrast on 8/19/2020 revealed a small fatty umbilical hernia but no other symptoms to explain his scrotal pain. He underwent open umbilical herniorrhaphy on 8/31/2020. Also treated empirically with a trial of Bactrim DS x 10 days for possible epididymitis on 8/18/2020. Follow up UA/UC on 8/25/2020 were negative.    He presents today for follow up and reports that the antibiotics did not help his symptoms. He continues to have intermittent right-sided scrotal pain that sometimes radiates to the left scrotum as well. The pain is currently mild and will come on at random times - laying in bed, after sex, while urinating, etc. For a few weeks after his herniorrhaphy, the scrotal pain did not bother him. However, it has started to become a nuisance again. He also continues to report intermittent shooting pains into his right great toe.     PEx  /64   Pulse 59   Ht 1.854 m (6' 1\")   Wt 74.8 kg (165 lb)   BMI 21.77 kg/m    GEN: " well-appearing male in NAD  RESP: no increased respiratory effort  : uncircumcised phallus, testes descended bilaterally, normal to palpation, non-tender, mild tenderness over bilateral epididymes (right > left), no inguinal hernias.     LABS:   8/25/2020 - UA: negative  8/25/2020 - UC: negative    IMAGING:   Abdomen and pelvis CT with contrast   8/19/2020  FINDINGS: Incomplete lower chest shows no abnormality.  In the abdomen and pelvis, the liver and spleen appear grossly normal  some benign focal fat in the liver near the falciform ligament (series  3 image 142). Gallbladder is decompressed. Bilateral adrenal glands  and bilateral kidneys appear normal. Pancreas appears grossly normal.  No enlarged mesenteric or retroperitoneal lymph nodes. There is a  small fatty umbilical hernia. No fluid collections or obvious  inflammatory changes. There are nonenlarged inguinal lymph nodes  bilaterally. No evidence of abnormal soft tissue stranding or masses  that either inguinal side. No free fluid.. Bladder appears normal. No  enlarged inguinal or deep pelvic lymph nodes.  Bones appear well-mineralized in the spine and lower ribs. No advanced  degenerative changes. Bony pelvis also appears well mineralized.                                                                   IMPRESSION: No inflammatory changes. Or masses. Small fatty umbilical  Hernia.      US TESTICULAR AND SCROTAL, 1/13/2020   Findings:  The testes demonstrate normal and symmetric echotexture and  vascularity. No evidence of a focal lesion.  The right testicle  measures 2.8 x 1.9 x 3.8 cm and the left measures 2.4 x 2.4 x 3.9 cm.  There is no evidence of torsion.     There is no evidence of a significant hydrocele or varicocele.     Both the right and left epididymis are within normal limits.                                                                  Impression: Normal testicular ultrasound.      A/P  25 year old male with chronic, intermittent  right-sided scrotal pain that occasionally radiates to the left scrotum as well. Seems less likely to be infectious in nature given negative urine testing, no improvement with antibiotics, and no evidence for infection on scrotal ultrasound from 1/2020 (obtained while patient was symptomatic). No explanation for patient's symptoms based on recent CT scan. Exam today is unremarkable with very mild tenderness to palpation along bilateral epididymes (right > left), but nothing to suggest overt infection, varicocele, hernia, etc. Discussed with patient that his symptoms may be neuropathic vs. musculoskeletal in origin. The intermittent pain shooting into his right toe may favor a radiculopathy picture. We therefore discussed referral to pelvic floor physical therapy. The patient is hesitant but amenable. Provided reassurance that no concerning cause for his pain has been identified thus far.  -Referral to pelvic floor PT through [facility].     Follow up as needed.    20 minutes spent with the patient, >50% in counseling and coordination of care.    China Delgado PA-C  Department of Urology

## 2020-09-29 NOTE — PROGRESS NOTES
"Urology Office Visit - Follow-up    Reason for visit: follow up for scrotal pain    HPI: Mr. Jacob Bolaños is a 25 year old male who presents today for follow up of scrotal pain. He was seen in initial consultation via virtual visit on 8/18/2020 - please see note from this date for full history. In brief, he has had intermittent right-sided scrotal pain since the Fall of 2019. Scrotal ultrasound in Jan 2020 was normal. He does describe a history of a right-sided testicle injury (punched in the area) leading to a right inguinal hernia s/p repair in 2006. Also with some abdominal pain and a bulge located close to his umbilicus. CT abd/pelv w/contrast on 8/19/2020 revealed a small fatty umbilical hernia but no other symptoms to explain his scrotal pain. He underwent open umbilical herniorrhaphy on 8/31/2020. Also treated empirically with a trial of Bactrim DS x 10 days for possible epididymitis on 8/18/2020. Follow up UA/UC on 8/25/2020 were negative.    He presents today for follow up and reports that the antibiotics did not help his symptoms. He continues to have intermittent right-sided scrotal pain that sometimes radiates to the left scrotum as well. The pain is currently mild and will come on at random times - laying in bed, after sex, while urinating, etc. For a few weeks after his herniorrhaphy, the scrotal pain did not bother him. However, it has started to become a nuisance again. He also continues to report intermittent shooting pains into his right great toe.     PEx  /64   Pulse 59   Ht 1.854 m (6' 1\")   Wt 74.8 kg (165 lb)   BMI 21.77 kg/m    GEN: well-appearing male in NAD  RESP: no increased respiratory effort  : uncircumcised phallus, testes descended bilaterally, normal to palpation, non-tender, mild tenderness over bilateral epididymes (right > left), no inguinal hernias.     LABS:   8/25/2020 - UA: negative  8/25/2020 - UC: negative    IMAGING:   Abdomen and pelvis CT with contrast "   8/19/2020  FINDINGS: Incomplete lower chest shows no abnormality.  In the abdomen and pelvis, the liver and spleen appear grossly normal  some benign focal fat in the liver near the falciform ligament (series  3 image 142). Gallbladder is decompressed. Bilateral adrenal glands  and bilateral kidneys appear normal. Pancreas appears grossly normal.  No enlarged mesenteric or retroperitoneal lymph nodes. There is a  small fatty umbilical hernia. No fluid collections or obvious  inflammatory changes. There are nonenlarged inguinal lymph nodes  bilaterally. No evidence of abnormal soft tissue stranding or masses  that either inguinal side. No free fluid.. Bladder appears normal. No  enlarged inguinal or deep pelvic lymph nodes.  Bones appear well-mineralized in the spine and lower ribs. No advanced  degenerative changes. Bony pelvis also appears well mineralized.                                                                   IMPRESSION: No inflammatory changes. Or masses. Small fatty umbilical  Hernia.      US TESTICULAR AND SCROTAL, 1/13/2020   Findings:  The testes demonstrate normal and symmetric echotexture and  vascularity. No evidence of a focal lesion.  The right testicle  measures 2.8 x 1.9 x 3.8 cm and the left measures 2.4 x 2.4 x 3.9 cm.  There is no evidence of torsion.     There is no evidence of a significant hydrocele or varicocele.     Both the right and left epididymis are within normal limits.                                                                  Impression: Normal testicular ultrasound.      A/P  25 year old male with chronic, intermittent right-sided scrotal pain that occasionally radiates to the left scrotum as well. Seems less likely to be infectious in nature given negative urine testing, no improvement with antibiotics, and no evidence for infection on scrotal ultrasound from 1/2020 (obtained while patient was symptomatic). No explanation for patient's symptoms based on recent CT  scan. Exam today is unremarkable with very mild tenderness to palpation along bilateral epididymes (right > left), but nothing to suggest overt infection, varicocele, hernia, etc. Discussed with patient that his symptoms may be neuropathic vs. musculoskeletal in origin. The intermittent pain shooting into his right toe may favor a radiculopathy picture. We therefore discussed referral to pelvic floor physical therapy. The patient is hesitant but amenable. Provided reassurance that no concerning cause for his pain has been identified thus far.  -Referral to pelvic floor PT through [facility].     Follow up as needed.    20 minutes spent with the patient, >50% in counseling and coordination of care.    China Delgado PA-C  Department of Urology

## 2020-09-29 NOTE — PATIENT INSTRUCTIONS
UROLOGY CLINIC VISIT PATIENT INSTRUCTIONS    It is recommended for you to see one of our dedicated men's health pelvic floor physical therapists.     The following PT's see and treat men for pelvic issues:  -Vicki Hadley in [facility]  -Abbi Schmidt in [place] ([facility])  -Sveta Garcia in [place]    Physical Therapy Referral    Please call [phone] to make an appointment. They may also call you to schedule.     [facility] - [email or web address]  [facility] - [email or web address]    Locations:    [facility] - [facility] PT [facility] - [facility] - [facility]   [facility] PT [facility] - [facility] PT [facility] - [facility] - [facility]       If you have any issues, questions or concerns in the meantime, do not hesitate to contact us at [phone] or via [employer].     It was a pleasure meeting with you today.  Thank you for allowing me and my team the privilege of caring for you today.  YOU are the reason we are here, and I truly hope we provided you with the excellent service you deserve.  Please let us know if there is anything else we can do for you so that we can be sure you are leaving completely satisfied with your care experience.

## 2020-10-09 ENCOUNTER — THERAPY VISIT (OUTPATIENT)
Dept: PHYSICAL THERAPY | Facility: CLINIC | Age: 25
End: 2020-10-09
Attending: PHYSICIAN ASSISTANT
Payer: COMMERCIAL

## 2020-10-09 DIAGNOSIS — N50.811 RIGHT TESTICULAR PAIN: ICD-10-CM

## 2020-10-09 DIAGNOSIS — N50.82 SCROTAL PAIN: ICD-10-CM

## 2020-10-09 PROCEDURE — 97110 THERAPEUTIC EXERCISES: CPT | Mod: GP | Performed by: PHYSICAL THERAPIST

## 2020-10-09 PROCEDURE — 97535 SELF CARE MNGMENT TRAINING: CPT | Mod: GP | Performed by: PHYSICAL THERAPIST

## 2020-10-09 PROCEDURE — 97112 NEUROMUSCULAR REEDUCATION: CPT | Mod: GP | Performed by: PHYSICAL THERAPIST

## 2020-10-09 PROCEDURE — 97161 PT EVAL LOW COMPLEX 20 MIN: CPT | Mod: GP | Performed by: PHYSICAL THERAPIST

## 2020-10-09 NOTE — PROGRESS NOTES
[facility] Initial Evaluation  Subjective:  The history is provided by the patient. No  was used.   Therapist Generated HPI Evaluation  Problem details: Patient presents with scrotal pain.  Patient saw MD for this issue on 9/29/2020.  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  He has had intermittent right-sided scrotal pain since the Fall of 2019. Scrotal ultrasound in Jan 2020 was normal. He does describe a history of a right-sided testicle injury (punched in the area) leading to a right inguinal hernia s/p repair in 2006. No mesh with this.   Also with some abdominal pain and a bulge located close to his umbilicus. CT abd/pelv w/contrast on 8/19/2020 revealed a small fatty umbilical hernia but no other symptoms to explain his scrotal pain. He underwent open umbilical herniorrhaphy on 8/31/2020. No mesh in that area.  Also treated empirically with a trial of Bactrim DS x 10 days for possible epididymitis on 8/18/2020. Follow up UA/UC on 8/25/2020 were negative.   Reports that the antibiotics did not help his symptoms. He continues to have intermittent right-sided scrotal pain that sometimes radiates to the left scrotum as well. This started within the last 3 months. The pain is currently mild and will come on at random times - laying in bed, after sex, while urinating, etc. For a few weeks after his herniorrhaphy, the scrotal pain did not bother him. However, it has started to become a nuisance again. He also continues to report intermittent shooting pains/tingling into his right great toe. Has not found any relief with the scrotal pain when it comes on.    Has a foam roller and does body weight workout.  Uses resistance bands.  Has a pull up bar and does push ups and abdominal work.  Does not use a lot of weight.  Does some running, rowing, and KOFI including jump rope. Has only returned to stretching/mobility.  Has a martial arts background.    .          Type of problem:  Pelvic dysfunction.    This is a recurrent condition.  Condition occurred with:  Insidious onset.                                           Objective:  Standing Alignment:          Pelvic:  Iliac crest high L                         Lumbar/SI Evaluation  ROM:    AROM Lumbar:   Flexion:        Hands to knees  Ext:                    50%, able to lie JACQUES   Side Bend:        Left:     Right:   Rotation:           Left:     Right:   Side Glide:        Left:     Right:           Lumbar Myotomes:  normal            Lumbar DTR's:  normal        Lumbar Dermtomes:  normal                Neural Tension/Mobility:      Left side:SLR  negative.   Right side:   Slump positive.  Right side:   SLR  negative.   Lumbar Palpation:  normal      Functional Tests:  not assessed (able to do push up without abdominal pain)                                    Pelvic Dysfunction Evaluation:    Bladder/Pelvic Problems:  Right testicular pain              Abdominal Wall:    Diastasis Recti:  Negative    Scar Mobility:  Healing well, decreased at recent umbilical incision  Pelvic Clock Exam:  NA            SI Provocation:  normal                        Hip Evaluation  HIP AROM:  AROM:    Left Hip:     Normal    Right Hip:   Normal                  Hip PROM:  Hip PROM:  Left Hip:    Normal  Right Hip:  Normal                                           General     ROS    Assessment/Plan:    Patient is a 25 year old male with pelvic complaints.    Patient has the following significant findings with corresponding treatment plan.                Diagnosis 1:  Right testicular pain  Pain -  manual therapy, self management, education and home program  Decreased ROM/flexibility - manual therapy, therapeutic exercise and home program  Decreased strength - therapeutic exercise, therapeutic activities and home program  Decreased function - therapeutic activities and home program    Therapy Evaluation Codes:   1) History comprised  [No Acute Distress] : no acute distress of:   Personal factors that impact the plan of care:      Time since onset of symptoms.    Comorbidity factors that impact the plan of care are:      None.     Medications impacting care: None.  2) Examination of Body Systems comprised of:   Body structures and functions that impact the plan of care:      Lumbar spine and Pelvis.   Activity limitations that impact the plan of care are:      Bending, Lifting, Sitting and [place].  3) Clinical presentation characteristics are:   Stable/Uncomplicated.  4) Decision-Making    Low complexity using standardized patient assessment instrument and/or measureable assessment of functional outcome.  Cumulative Therapy Evaluation is: Low complexity.    Previous and current functional limitations:  (See Goal Flow Sheet for this information)    Short term and Long term goals: (See Goal Flow Sheet for this information)     Communication ability:  Patient appears to be able to clearly communicate and understand verbal and written communication and follow directions correctly.  Treatment Explanation - The following has been discussed with the patient:   RX ordered/plan of care  Anticipated outcomes  Possible risks and side effects  This patient would benefit from PT intervention to resume normal activities.   Rehab potential is excellent.    Frequency:  2 X a month, once daily  Duration:  for 2 months  Discharge Plan:  Achieve all LTG.  Independent in home treatment program.  Reach maximal therapeutic benefit.    Please refer to the daily flowsheet for treatment today, total treatment time and time spent performing 1:1 timed codes.        [Normal Sclera/Conjunctiva] : normal sclera/conjunctiva [Normal Outer Ear/Nose] : the outer ears and nose were normal in appearance

## 2020-10-20 NOTE — PROGRESS NOTES
[facility] Initial Evaluation  Subjective:    Patient Health History           General health as reported by patient is fair.  Pertinent medical history includes: asthma, concussions/dizziness and migraines/headaches. Other medical history details: surgery.   Red flags:  Changes in bowel and bladder habits, chest pain and other.  Medical allergies: other. Other medical allergies details: zythromax.   Surgeries include:  Other. Other surgery history details: hernia aug 2020, and oct 2006 appendix march 2002.    Current medications:  Other. Other medications details: nasacort.    Current occupation is .   Primary job tasks include:  Computer work, driving, lifting/carrying, prolonged sitting, pushing/pulling and prolonged standing.                                    Objective:  System    Physical Exam    General     ROS    Assessment/Plan:

## 2020-10-26 ENCOUNTER — THERAPY VISIT (OUTPATIENT)
Dept: PHYSICAL THERAPY | Facility: CLINIC | Age: 25
End: 2020-10-26
Payer: COMMERCIAL

## 2020-10-26 DIAGNOSIS — N50.811 RIGHT TESTICULAR PAIN: ICD-10-CM

## 2020-10-26 PROCEDURE — 97112 NEUROMUSCULAR REEDUCATION: CPT | Mod: GP | Performed by: PHYSICAL THERAPIST

## 2020-10-26 PROCEDURE — 97110 THERAPEUTIC EXERCISES: CPT | Mod: GP | Performed by: PHYSICAL THERAPIST

## 2020-11-12 ENCOUNTER — THERAPY VISIT (OUTPATIENT)
Dept: PHYSICAL THERAPY | Facility: CLINIC | Age: 25
End: 2020-11-12
Payer: COMMERCIAL

## 2020-11-12 DIAGNOSIS — N50.811 RIGHT TESTICULAR PAIN: ICD-10-CM

## 2020-11-12 PROCEDURE — 97110 THERAPEUTIC EXERCISES: CPT | Mod: GP | Performed by: PHYSICAL THERAPIST

## 2020-11-12 PROCEDURE — 97140 MANUAL THERAPY 1/> REGIONS: CPT | Mod: GP | Performed by: PHYSICAL THERAPIST

## 2020-11-25 ENCOUNTER — THERAPY VISIT (OUTPATIENT)
Dept: PHYSICAL THERAPY | Facility: CLINIC | Age: 25
End: 2020-11-25
Payer: COMMERCIAL

## 2020-11-25 DIAGNOSIS — N50.811 RIGHT TESTICULAR PAIN: ICD-10-CM

## 2020-11-25 PROCEDURE — 97110 THERAPEUTIC EXERCISES: CPT | Mod: GP | Performed by: PHYSICAL THERAPIST

## 2020-11-25 PROCEDURE — 97112 NEUROMUSCULAR REEDUCATION: CPT | Mod: GP | Performed by: PHYSICAL THERAPIST

## 2020-11-25 NOTE — PROGRESS NOTES
Subjective:  HPI  Physical Exam                    Objective:  System    Physical Exam    General     ROS    Assessment/Plan:    DISCHARGE REPORT    Progress reporting period is from 10/9/2020 to 11/25/2020.       SUBJECTIVE  Subjective changes noted by patient:  .  Subjective: Overall doing well.  Has some pain in the upper abdominal pain that is random in nature.  Not sure what is aggravating it. Testicular pain is better with massage in the perineum.   Lumbar support helps. Conscious about relaxtion as well    Current pain level is 0/10  .     Previous pain level was   Initial Pain level: 5/10.   Changes in function:  Yes (See Goal flowsheet attached for changes in current functional level)  Adverse reaction to treatment or activity: None    OBJECTIVE  Changes noted in objective findings:  Yes,   Objective: Was able to hang from the pull up bar.  Discussed progression.  Tried acosta ropes and this went well  No pain with supine abdominal leg extension.  No pain with partial curl up as well.  Discussed safe progression with return to all aerobic and weight exercises.       ASSESSMENT/PLAN  Updated problem list and treatment plan: Diagnosis 1:  Pelvic pain  Pain -  manual therapy, self management, education and home program  Decreased ROM/flexibility - manual therapy, therapeutic exercise and home program  Decreased strength - therapeutic exercise, therapeutic activities and home program  Decreased function - therapeutic activities and home program  STG/LTGs have been met or progress has been made towards goals:  Yes (See Goal flow sheet completed today.)  Assessment of Progress: The patient's condition is improving.  The patient's condition has potential to improve.  Self Management Plans:  Patient has been instructed in a home treatment program.    Jacob continues to require the following intervention to meet STG and LTG's:  Patient needs to continue to work on the home exercise  program.      Recommendations:  This patient is ready to be discharged from therapy and continue their home treatment program.    Please refer to the daily flowsheet for treatment today, total treatment time and time spent performing 1:1 timed codes.

## 2021-01-04 ENCOUNTER — HEALTH MAINTENANCE LETTER (OUTPATIENT)
Age: 26
End: 2021-01-04

## 2021-07-20 NOTE — TELEPHONE ENCOUNTER
Visit Type : dysuria    Records/Orders: NA    Pt Contacted: no    At Rooming: normal   
resume usual diet

## 2021-10-10 ENCOUNTER — HEALTH MAINTENANCE LETTER (OUTPATIENT)
Age: 26
End: 2021-10-10

## 2022-01-30 ENCOUNTER — HEALTH MAINTENANCE LETTER (OUTPATIENT)
Age: 27
End: 2022-01-30

## 2022-09-18 ENCOUNTER — HEALTH MAINTENANCE LETTER (OUTPATIENT)
Age: 27
End: 2022-09-18

## 2023-05-07 ENCOUNTER — HEALTH MAINTENANCE LETTER (OUTPATIENT)
Age: 28
End: 2023-05-07

## (undated) DEVICE — SOL WATER IRRIG 500ML BOTTLE 2F7113

## (undated) DEVICE — NDL 25GA 2"  8881200441

## (undated) DEVICE — LABEL MEDICATION SYSTEM 3303-P

## (undated) DEVICE — GLOVE PROTEXIS POWDER FREE SMT 7.5  2D72PT75X

## (undated) DEVICE — SU PROLENE 2-0 CT-2 30" 8411H

## (undated) DEVICE — GLOVE PROTEXIS BLUE W/NEU-THERA 8.0  2D73EB80

## (undated) DEVICE — ESU ELEC NDL 1" COATED/INSULATED E1465

## (undated) DEVICE — DRAPE LAP W/ARMBOARD 29410

## (undated) DEVICE — BLADE CLIPPER SGL USE 9680

## (undated) DEVICE — SU MONOCRYL 4-0 PS-2 27" UND Y426H

## (undated) DEVICE — PAD CHUX UNDERPAD 30X30"

## (undated) DEVICE — BLADE KNIFE SURG 10 371110

## (undated) DEVICE — SUCTION TIP YANKAUER W/O VENT K86

## (undated) DEVICE — SYR 10ML FINGER CONTROL W/O NDL 309695

## (undated) DEVICE — VESSEL LOOPS YELLOW MAXI 31145694

## (undated) DEVICE — ADH SKIN CLOSURE PREMIERPRO EXOFIN 1.0ML 3470

## (undated) DEVICE — PREP CHLORAPREP 26ML TINTED ORANGE  260815

## (undated) DEVICE — LINEN TOWEL PACK X6 WHITE 5487

## (undated) DEVICE — NDL 25GA 5/8" 305122

## (undated) DEVICE — SU SILK 2-0 TIE 12X30" A305H

## (undated) DEVICE — SYR BULB IRRIG 50ML LATEX FREE 0035280

## (undated) DEVICE — SU VICRYL 0 UR-6 27" J603H

## (undated) DEVICE — DRSG STERI STRIP 1/2X4" R1547

## (undated) DEVICE — ESU GROUND PAD ADULT W/CORD E7507

## (undated) DEVICE — SOL NACL 0.9% IRRIG 500ML BOTTLE 2F7123

## (undated) DEVICE — LINEN TOWEL PACK X5 5464

## (undated) DEVICE — PACK MINOR CUSTOM ASC

## (undated) DEVICE — SUCTION MANIFOLD NEPTUNE 2 SYS 1 PORT 702-025-000

## (undated) RX ORDER — FENTANYL CITRATE 50 UG/ML
INJECTION, SOLUTION INTRAMUSCULAR; INTRAVENOUS
Status: DISPENSED
Start: 2020-08-31

## (undated) RX ORDER — GABAPENTIN 300 MG/1
CAPSULE ORAL
Status: DISPENSED
Start: 2020-08-31

## (undated) RX ORDER — LIDOCAINE HYDROCHLORIDE 20 MG/ML
INJECTION, SOLUTION EPIDURAL; INFILTRATION; INTRACAUDAL; PERINEURAL
Status: DISPENSED
Start: 2020-08-31

## (undated) RX ORDER — ACETAMINOPHEN 325 MG/1
TABLET ORAL
Status: DISPENSED
Start: 2020-08-31

## (undated) RX ORDER — ONDANSETRON 2 MG/ML
INJECTION INTRAMUSCULAR; INTRAVENOUS
Status: DISPENSED
Start: 2020-08-31

## (undated) RX ORDER — GLYCOPYRROLATE 0.2 MG/ML
INJECTION INTRAMUSCULAR; INTRAVENOUS
Status: DISPENSED
Start: 2020-08-31

## (undated) RX ORDER — PROPOFOL 10 MG/ML
INJECTION, EMULSION INTRAVENOUS
Status: DISPENSED
Start: 2020-08-31

## (undated) RX ORDER — LIDOCAINE HYDROCHLORIDE AND EPINEPHRINE 10; 10 MG/ML; UG/ML
INJECTION, SOLUTION INFILTRATION; PERINEURAL
Status: DISPENSED
Start: 2020-08-31

## (undated) RX ORDER — DEXAMETHASONE SODIUM PHOSPHATE 4 MG/ML
INJECTION, SOLUTION INTRA-ARTICULAR; INTRALESIONAL; INTRAMUSCULAR; INTRAVENOUS; SOFT TISSUE
Status: DISPENSED
Start: 2020-08-31

## (undated) RX ORDER — KETOROLAC TROMETHAMINE 30 MG/ML
INJECTION, SOLUTION INTRAMUSCULAR; INTRAVENOUS
Status: DISPENSED
Start: 2020-08-31

## (undated) RX ORDER — CEFAZOLIN SODIUM 1 G/50ML
SOLUTION INTRAVENOUS
Status: DISPENSED
Start: 2020-08-31

## (undated) RX ORDER — OXYCODONE HYDROCHLORIDE 5 MG/1
TABLET ORAL
Status: DISPENSED
Start: 2020-08-31

## (undated) RX ORDER — BUPIVACAINE HYDROCHLORIDE 2.5 MG/ML
INJECTION, SOLUTION EPIDURAL; INFILTRATION; INTRACAUDAL
Status: DISPENSED
Start: 2020-08-31